# Patient Record
Sex: FEMALE | Race: WHITE | NOT HISPANIC OR LATINO | Employment: OTHER | RURAL
[De-identification: names, ages, dates, MRNs, and addresses within clinical notes are randomized per-mention and may not be internally consistent; named-entity substitution may affect disease eponyms.]

---

## 2017-09-16 ENCOUNTER — OFFICE VISIT (OUTPATIENT)
Dept: RETAIL CLINIC | Facility: CLINIC | Age: 67
End: 2017-09-16

## 2017-09-16 VITALS
WEIGHT: 144 LBS | OXYGEN SATURATION: 98 % | TEMPERATURE: 98 F | HEART RATE: 121 BPM | HEIGHT: 60 IN | RESPIRATION RATE: 17 BRPM | BODY MASS INDEX: 28.27 KG/M2

## 2017-09-16 DIAGNOSIS — J40 BRONCHITIS: Primary | ICD-10-CM

## 2017-09-16 DIAGNOSIS — J01.00 ACUTE MAXILLARY SINUSITIS, RECURRENCE NOT SPECIFIED: ICD-10-CM

## 2017-09-16 PROCEDURE — 99213 OFFICE O/P EST LOW 20 MIN: CPT | Performed by: NURSE PRACTITIONER

## 2017-09-16 RX ORDER — AZITHROMYCIN 250 MG/1
TABLET, FILM COATED ORAL
Qty: 6 TABLET | Refills: 0 | Status: SHIPPED | OUTPATIENT
Start: 2017-09-16 | End: 2022-11-04

## 2017-09-16 RX ORDER — PREDNISONE 20 MG/1
20 TABLET ORAL DAILY
Qty: 3 TABLET | Refills: 0 | Status: SHIPPED | OUTPATIENT
Start: 2017-09-16 | End: 2017-09-19

## 2017-09-16 NOTE — PATIENT INSTRUCTIONS
Sinusitis, Adult  Sinusitis is soreness and inflammation of your sinuses. Sinuses are hollow spaces in the bones around your face. Your sinuses are located:  · Around your eyes.  · In the middle of your forehead.  · Behind your nose.  · In your cheekbones.  Your sinuses and nasal passages are lined with a stringy fluid (mucus). Mucus normally drains out of your sinuses. When your nasal tissues become inflamed or swollen, the mucus can become trapped or blocked so air cannot flow through your sinuses. This allows bacteria, viruses, and funguses to grow, which leads to infection.  Sinusitis can develop quickly and last for 7-10 days (acute) or for more than 12 weeks (chronic). Sinusitis often develops after a cold.  CAUSES  This condition is caused by anything that creates swelling in the sinuses or stops mucus from draining, including:  · Allergies.  · Asthma.  · Bacterial or viral infection.  · Abnormally shaped bones between the nasal passages.  · Nasal growths that contain mucus (nasal polyps).  · Narrow sinus openings.  · Pollutants, such as chemicals or irritants in the air.  · A foreign object stuck in the nose.  · A fungal infection. This is rare.  RISK FACTORS  The following factors may make you more likely to develop this condition:  · Having allergies or asthma.  · Having had a recent cold or respiratory tract infection.  · Having structural deformities or blockages in your nose or sinuses.  · Having a weak immune system.  · Doing a lot of swimming or diving.  · Overusing nasal sprays.  · Smoking.  SYMPTOMS  The main symptoms of this condition are pain and a feeling of pressure around the affected sinuses. Other symptoms include:  · Upper toothache.  · Earache.  · Headache.  · Bad breath.  · Decreased sense of smell and taste.  · A cough that may get worse at night.  · Fatigue.  · Fever.  · Thick drainage from your nose. The drainage is often green and it may contain pus (purulent).  · Stuffy nose or  congestion.  · Postnasal drip. This is when extra mucus collects in the throat or back of the nose.  · Swelling and warmth over the affected sinuses.  · Sore throat.  · Sensitivity to light.  DIAGNOSIS  This condition is diagnosed based on symptoms, a medical history, and a physical exam. To find out if your condition is acute or chronic, your health care provider may:  · Look in your nose for signs of nasal polyps.  · Tap over the affected sinus to check for signs of infection.  · View the inside of your sinuses using an imaging device that has a light attached (endoscope).  If your health care provider suspects that you have chronic sinusitis, you may also:  · Be tested for allergies.  · Have a sample of mucus taken from your nose (nasal culture) and checked for bacteria.  · Have a mucus sample examined to see if your sinusitis is related to an allergy.  If your sinusitis does not respond to treatment and it lasts longer than 8 weeks, you may have an MRI or CT scan to check your sinuses. These scans also help to determine how severe your infection is.  In rare cases, a bone biopsy may be done to rule out more serious types of fungal sinus disease.  TREATMENT  Treatment for sinusitis depends on the cause and whether your condition is chronic or acute. If a virus is causing your sinusitis, your symptoms will go away on their own within 10 days. You may be given medicines to relieve your symptoms, including:  · Topical nasal decongestants. They shrink swollen nasal passages and let mucus drain from your sinuses.  · Antihistamines. These drugs block inflammation that is triggered by allergies. This can help to ease swelling in your nose and sinuses.  · Topical nasal corticosteroids. These are nasal sprays that ease inflammation and swelling in your nose and sinuses.  · Nasal saline washes. These rinses can help to get rid of thick mucus in your nose.  If your condition is caused by bacteria, you will be given an  antibiotic medicine. If your condition is caused by a fungus, you will be given an antifungal medicine.  Surgery may be needed to correct underlying conditions, such as narrow nasal passages. Surgery may also be needed to remove polyps.  HOME CARE INSTRUCTIONS  Medicines  · Take, use, or apply over-the-counter and prescription medicines only as told by your health care provider. These may include nasal sprays.  · If you were prescribed an antibiotic medicine, take it as told by your health care provider. Do not stop taking the antibiotic even if you start to feel better.  Hydrate and Humidify  · Drink enough water to keep your urine clear or pale yellow. Staying hydrated will help to thin your mucus.  · Use a cool mist humidifier to keep the humidity level in your home above 50%.  · Inhale steam for 10-15 minutes, 3-4 times a day or as told by your health care provider. You can do this in the bathroom while a hot shower is running.  · Limit your exposure to cool or dry air.  Rest  · Rest as much as possible.  · Sleep with your head raised (elevated).  · Make sure to get enough sleep each night.  General Instructions  · Apply a warm, moist washcloth to your face 3-4 times a day or as told by your health care provider. This will help with discomfort.  · Wash your hands often with soap and water to reduce your exposure to viruses and other germs. If soap and water are not available, use hand .  · Do not smoke. Avoid being around people who are smoking (secondhand smoke).  · Keep all follow-up visits as told by your health care provider. This is important.  SEEK MEDICAL CARE IF:  · You have a fever.  · Your symptoms get worse.  · Your symptoms do not improve within 10 days.  SEEK IMMEDIATE MEDICAL CARE IF:  · You have a severe headache.  · You have persistent vomiting.  · You have pain or swelling around your face or eyes.  · You have vision problems.  · You develop confusion.  · Your neck is stiff.  · You have  "trouble breathing.     This information is not intended to replace advice given to you by your health care provider. Make sure you discuss any questions you have with your health care provider.     Document Released: 12/18/2006 Document Revised: 04/10/2017 Document Reviewed: 10/12/2016  [employer] Patient Education ©2017 [employer]    Upper Respiratory Infection, Adult  Most upper respiratory infections (URIs) are a viral infection of the air passages leading to the lungs. A URI affects the nose, throat, and upper air passages. The most common type of URI is nasopharyngitis and is typically referred to as \"the common cold.\"  URIs run their course and usually go away on their own. Most of the time, a URI does not require medical attention, but sometimes a bacterial infection in the upper airways can follow a viral infection. This is called a secondary infection. Sinus and middle ear infections are common types of secondary upper respiratory infections.  Bacterial pneumonia can also complicate a URI. A URI can worsen asthma and chronic obstructive pulmonary disease (COPD). Sometimes, these complications can require emergency medical care and may be life threatening.   CAUSES  Almost all URIs are caused by viruses. A virus is a type of germ and can spread from one person to another.   RISKS FACTORS  You may be at risk for a URI if:   · You smoke.    · You have chronic heart or lung disease.  · You have a weakened defense (immune) system.    · You are very young or very old.    · You have nasal allergies or asthma.  · You work in crowded or poorly ventilated areas.  · You work in health care facilities or schools.  SIGNS AND SYMPTOMS   Symptoms typically develop 2-3 days after you come in contact with a cold virus. Most viral URIs last 7-10 days. However, viral URIs from the influenza virus (flu virus) can last 14-18 days and are typically more severe. Symptoms may include:   · Runny or stuffy (congested) " nose.    · Sneezing.    · Cough.    · Sore throat.    · Headache.    · Fatigue.    · Fever.    · Loss of appetite.    · Pain in your forehead, behind your eyes, and over your cheekbones (sinus pain).  · Muscle aches.    DIAGNOSIS   Your health care provider may diagnose a URI by:  · Physical exam.  · Tests to check that your symptoms are not due to another condition such as:  ¨ Strep throat.  ¨ Sinusitis.  ¨ Pneumonia.  ¨ Asthma.  TREATMENT   A URI goes away on its own with time. It cannot be cured with medicines, but medicines may be prescribed or recommended to relieve symptoms. Medicines may help:  · Reduce your fever.  · Reduce your cough.  · Relieve nasal congestion.  HOME CARE INSTRUCTIONS   · Take medicines only as directed by your health care provider.    · Gargle warm saltwater or take cough drops to comfort your throat as directed by your health care provider.  · Use a warm mist humidifier or inhale steam from a shower to increase air moisture. This may make it easier to breathe.  · Drink enough fluid to keep your urine clear or pale yellow.    · Eat soups and other clear broths and maintain good nutrition.    · Rest as needed.    · Return to work when your temperature has returned to normal or as your health care provider advises. You may need to stay home longer to avoid infecting others. You can also use a face mask and careful hand washing to prevent spread of the virus.  · Increase the usage of your inhaler if you have asthma.    · Do not use any tobacco products, including cigarettes, chewing tobacco, or electronic cigarettes. If you need help quitting, ask your health care provider.  PREVENTION   The best way to protect yourself from getting a cold is to practice good hygiene.   · Avoid oral or hand contact with people with cold symptoms.    · Wash your hands often if contact occurs.    There is no clear evidence that vitamin C, vitamin E, echinacea, or exercise reduces the chance of developing a  cold. However, it is always recommended to get plenty of rest, exercise, and practice good nutrition.   SEEK MEDICAL CARE IF:   · You are getting worse rather than better.    · Your symptoms are not controlled by medicine.    · You have chills.  · You have worsening shortness of breath.  · You have brown or red mucus.  · You have yellow or brown nasal discharge.  · You have pain in your face, especially when you bend forward.  · You have a fever.  · You have swollen neck glands.  · You have pain while swallowing.  · You have white areas in the back of your throat.  SEEK IMMEDIATE MEDICAL CARE IF:   · You have severe or persistent:    Headache.    Ear pain.    Sinus pain.    Chest pain.  · You have chronic lung disease and any of the following:    Wheezing.    Prolonged cough.    Coughing up blood.    A change in your usual mucus.  · You have a stiff neck.  · You have changes in your:    Vision.    Hearing.    Thinking.    Mood.  MAKE SURE YOU:   · Understand these instructions.  · Will watch your condition.  · Will get help right away if you are not doing well or get worse.     This information is not intended to replace advice given to you by your health care provider. Make sure you discuss any questions you have with your health care provider.     Document Released: 06/13/2002 Document Revised: 05/03/2016 Document Reviewed: 03/25/2015  [employer] Patient Education ©2017 [employer]

## 2017-09-16 NOTE — PROGRESS NOTES
"Yola Real is a 67 y.o. female.   Pulse (!) 121  Temp 98 °F (36.7 °C)  Resp 17  Ht 60\" (152.4 cm)  Wt 144 lb (65.3 kg)  SpO2 98%  BMI 28.12 kg/m2      Sinusitis   This is a new problem. The current episode started in the past 7 days. The problem has been gradually worsening since onset. The maximum temperature recorded prior to her arrival was 100.4 - 100.9 F. The pain is moderate. Associated symptoms include congestion, coughing (productive), diaphoresis, headaches, sinus pressure (worse on left side) and a sore throat. Pertinent negatives include no ear pain, hoarse voice, neck pain, shortness of breath, sneezing or swollen glands.        The following portions of the patient's history were reviewed and updated as appropriate: allergies, current medications, past family history, past medical history, past social history, past surgical history and problem list.    Review of Systems   Constitutional: Positive for diaphoresis.   HENT: Positive for congestion, sinus pressure (worse on left side) and sore throat. Negative for ear pain, hoarse voice and sneezing.    Respiratory: Positive for cough (productive). Negative for shortness of breath.    Musculoskeletal: Negative for neck pain.   Neurological: Positive for headaches.       Objective   Physical Exam   Constitutional: She appears well-developed and well-nourished.  Non-toxic appearance. She has a sickly appearance.   HENT:   Head: Normocephalic and atraumatic.   Right Ear: Tympanic membrane is bulging. Tympanic membrane is not erythematous.   Left Ear: Tympanic membrane is bulging. Tympanic membrane is not erythematous.   Nose: Mucosal edema and rhinorrhea present. Right sinus exhibits maxillary sinus tenderness. Left sinus exhibits maxillary sinus tenderness.   Mouth/Throat: Uvula is midline. Posterior oropharyngeal erythema present. No tonsillar exudate.   Cardiovascular: Regular rhythm and normal heart sounds.    Pulmonary/Chest: " Effort normal. She has no wheezes. She has no rhonchi. She has no rales.   Lymphadenopathy:     She has no cervical adenopathy.   Skin: Skin is warm and dry.       Assessment/Plan   Kymberly was seen today for sinusitis.    Diagnoses and all orders for this visit:    Bronchitis    Acute maxillary sinusitis, recurrence not specified    Other orders  -     azithromycin (ZITHROMAX Z-EVAN) 250 MG tablet; Take 2 tablets the first day, then 1 tablet daily for 4 days.  -     predniSONE (DELTASONE) 20 MG tablet; Take 1 tablet by mouth Daily for 3 days.

## 2022-08-19 DIAGNOSIS — G57.93 NEUROPATHY INVOLVING BOTH LOWER EXTREMITIES: Primary | ICD-10-CM

## 2022-08-19 RX ORDER — GABAPENTIN 300 MG/1
CAPSULE ORAL
Qty: 150 CAPSULE | Refills: 0 | Status: SHIPPED | OUTPATIENT
Start: 2022-08-19 | End: 2022-09-19

## 2022-09-18 DIAGNOSIS — G57.93 NEUROPATHY INVOLVING BOTH LOWER EXTREMITIES: ICD-10-CM

## 2022-09-19 RX ORDER — DOXEPIN HYDROCHLORIDE 50 MG/1
CAPSULE ORAL
Qty: 180 CAPSULE | Refills: 0 | Status: SHIPPED | OUTPATIENT
Start: 2022-09-19 | End: 2022-12-14

## 2022-09-19 RX ORDER — GABAPENTIN 300 MG/1
CAPSULE ORAL
Qty: 150 CAPSULE | Refills: 0 | Status: SHIPPED | OUTPATIENT
Start: 2022-09-19 | End: 2022-10-18

## 2022-10-18 DIAGNOSIS — G57.93 NEUROPATHY INVOLVING BOTH LOWER EXTREMITIES: ICD-10-CM

## 2022-10-18 RX ORDER — LISINOPRIL 10 MG/1
TABLET ORAL
Qty: 90 TABLET | Refills: 0 | Status: SHIPPED | OUTPATIENT
Start: 2022-10-18 | End: 2023-01-12

## 2022-10-18 RX ORDER — GABAPENTIN 300 MG/1
CAPSULE ORAL
Qty: 150 CAPSULE | Refills: 0 | Status: SHIPPED | OUTPATIENT
Start: 2022-10-18 | End: 2022-11-16

## 2022-11-03 RX ORDER — ROPINIROLE 1 MG/1
1 TABLET, FILM COATED ORAL
COMMUNITY
Start: 2022-09-26 | End: 2023-01-03

## 2022-11-03 RX ORDER — PRAVASTATIN SODIUM 40 MG
40 TABLET ORAL
COMMUNITY
Start: 2022-10-12 | End: 2023-01-12

## 2022-11-04 ENCOUNTER — OFFICE VISIT (OUTPATIENT)
Dept: FAMILY MEDICINE CLINIC | Facility: CLINIC | Age: 72
End: 2022-11-04

## 2022-11-04 VITALS
SYSTOLIC BLOOD PRESSURE: 118 MMHG | OXYGEN SATURATION: 99 % | TEMPERATURE: 97 F | WEIGHT: 139.5 LBS | HEART RATE: 82 BPM | HEIGHT: 60 IN | DIASTOLIC BLOOD PRESSURE: 77 MMHG | BODY MASS INDEX: 27.39 KG/M2

## 2022-11-04 DIAGNOSIS — M79.7 FIBROMYALGIA: ICD-10-CM

## 2022-11-04 DIAGNOSIS — I10 ESSENTIAL (PRIMARY) HYPERTENSION: ICD-10-CM

## 2022-11-04 DIAGNOSIS — M85.852 OSTEOPENIA OF BOTH HIPS: ICD-10-CM

## 2022-11-04 DIAGNOSIS — Z00.00 ROUTINE GENERAL MEDICAL EXAMINATION AT A HEALTH CARE FACILITY: Primary | ICD-10-CM

## 2022-11-04 DIAGNOSIS — M85.851 OSTEOPENIA OF BOTH HIPS: ICD-10-CM

## 2022-11-04 DIAGNOSIS — G89.29 CHRONIC BILATERAL LOW BACK PAIN WITH LEFT-SIDED SCIATICA: ICD-10-CM

## 2022-11-04 DIAGNOSIS — G89.29 CHRONIC BILATERAL LOW BACK PAIN WITH RIGHT-SIDED SCIATICA: ICD-10-CM

## 2022-11-04 DIAGNOSIS — E66.3 OVERWEIGHT: ICD-10-CM

## 2022-11-04 DIAGNOSIS — J43.9 PULMONARY EMPHYSEMA, UNSPECIFIED EMPHYSEMA TYPE: ICD-10-CM

## 2022-11-04 DIAGNOSIS — R53.83 OTHER FATIGUE: ICD-10-CM

## 2022-11-04 DIAGNOSIS — F17.210 LIGHT CIGARETTE SMOKER (1-9 CIGS/DAY): ICD-10-CM

## 2022-11-04 DIAGNOSIS — M54.41 CHRONIC BILATERAL LOW BACK PAIN WITH RIGHT-SIDED SCIATICA: ICD-10-CM

## 2022-11-04 DIAGNOSIS — E78.5 DYSLIPIDEMIA: ICD-10-CM

## 2022-11-04 DIAGNOSIS — E55.9 VITAMIN D INSUFFICIENCY: ICD-10-CM

## 2022-11-04 DIAGNOSIS — R73.9 HYPERGLYCEMIA: ICD-10-CM

## 2022-11-04 DIAGNOSIS — G25.81 RESTLESS LEG SYNDROME: ICD-10-CM

## 2022-11-04 DIAGNOSIS — Z11.59 NEED FOR HEPATITIS C SCREENING TEST: ICD-10-CM

## 2022-11-04 DIAGNOSIS — M54.42 CHRONIC BILATERAL LOW BACK PAIN WITH LEFT-SIDED SCIATICA: ICD-10-CM

## 2022-11-04 DIAGNOSIS — N18.31 CHRONIC KIDNEY DISEASE, STAGE 3A: ICD-10-CM

## 2022-11-04 PROBLEM — Z86.69 HISTORY OF MIGRAINE HEADACHES: Status: ACTIVE | Noted: 2022-11-04

## 2022-11-04 PROBLEM — R05.3 CHRONIC COUGH: Status: ACTIVE | Noted: 2022-11-04

## 2022-11-04 PROBLEM — J44.9 COPD (CHRONIC OBSTRUCTIVE PULMONARY DISEASE) (HCC): Status: ACTIVE | Noted: 2022-11-04

## 2022-11-04 PROBLEM — M85.9 DISORDER OF BONE DENSITY AND STRUCTURE, UNSPECIFIED: Status: ACTIVE | Noted: 2022-11-04

## 2022-11-04 PROBLEM — J44.9 CHRONIC OBSTRUCTIVE PULMONARY DISEASE, UNSPECIFIED: Status: ACTIVE | Noted: 2022-11-04

## 2022-11-04 PROBLEM — M85.80 OSTEOPENIA: Status: ACTIVE | Noted: 2022-11-04

## 2022-11-04 PROCEDURE — G0439 PPPS, SUBSEQ VISIT: HCPCS | Performed by: INTERNAL MEDICINE

## 2022-11-04 PROCEDURE — 1160F RVW MEDS BY RX/DR IN RCRD: CPT | Performed by: INTERNAL MEDICINE

## 2022-11-04 PROCEDURE — 36415 COLL VENOUS BLD VENIPUNCTURE: CPT | Performed by: INTERNAL MEDICINE

## 2022-11-04 PROCEDURE — 1170F FXNL STATUS ASSESSED: CPT | Performed by: INTERNAL MEDICINE

## 2022-11-04 PROCEDURE — 93000 ELECTROCARDIOGRAM COMPLETE: CPT | Performed by: INTERNAL MEDICINE

## 2022-11-04 RX ORDER — CHLORAL HYDRATE 500 MG
CAPSULE ORAL
COMMUNITY

## 2022-11-04 RX ORDER — ASCORBIC ACID 100 MG
TABLET,CHEWABLE ORAL
COMMUNITY

## 2022-11-04 RX ORDER — ALBUTEROL SULFATE 90 UG/1
2 AEROSOL, METERED RESPIRATORY (INHALATION) EVERY 4 HOURS PRN
Qty: 18 G | Refills: 3 | Status: SHIPPED | OUTPATIENT
Start: 2022-11-04

## 2022-11-04 RX ORDER — CYCLOBENZAPRINE HCL 10 MG
TABLET ORAL
COMMUNITY
End: 2022-11-04

## 2022-11-04 NOTE — PROGRESS NOTES
The ABCs of the Annual Wellness Visit  Subsequent Medicare Wellness Visit    Chief Complaint   Patient presents with   • Annual Exam      Subjective    History of Present Illness:  Kymberly Real is a 72 y.o. female who presents for a Subsequent Medicare Wellness Visit.    Patient presents for her yearly adult wellness visit.  Her primary complaint relates to chronic low back pain with bilateral sciatica, right greater than left, rating her pain typically anywhere from a 5-10 on a scale of 10.  She has seen Dr. Surya Preciado orthopedic surgeon relating several months ago having had another MRI of her lower back, felt nonsurgical, having failed epidural injections, and recently having been referred to pain management.  She does take gabapentin 300 mg, 5 capsules daily in divided doses for this problem, not getting much relief, not a candidate for NSAIDs given her chronic kidney disease.  She also has history of COPD manifested by cough but really no dyspnea.  Cannot afford any of the controller medications and uses albuterol only sporadically.  She continues to smoke about 5 cigarettes daily for the last 20 years previous to that 1 pack/day x 30 years.  Not yet ready to stop smoking.  History of hypertension and averaging 110s over 70s checked twice monthly.  Restless legs doing well on ropinirole.  No recent migraine headaches with history of same.  Current with colonoscopy, recommended for 3-year follow-up study in 5/2025.  2  The following portions of the patient's history were reviewed and   updated as appropriate: allergies, current medications, past family history, past medical history, past social history, past surgical history and problem list.    Compared to one year ago, the patient feels her physical   health is the same.    Compared to one year ago, the patient feels her mental   health is the same.    Recent Hospitalizations:  She was not admitted to the hospital during the last year.       Current  Medical Providers:  Patient Care Team:  Kai Coats MD as PCP - General (Internal Medicine)  Kai Coats MD as Referring Physician (Internal Medicine)    Outpatient Medications Prior to Visit   Medication Sig Dispense Refill   • ALENDRONATE SODIUM PO Take  by mouth.     • Ascorbic Acid (Vitamin C) 100 MG chewable tablet Vitamin C     • Calcium Carbonate-Vitamin D 600-10 MG-MCG per tablet Take 1 tablet by mouth 2 (Two) Times a Day.     • doxepin (SINEquan) 50 MG capsule TAKE 2 CAPSULES BY MOUTH AT BEDTIME 180 capsule 0   • gabapentin (NEURONTIN) 300 MG capsule TAKE 1 CAPSULE BY MOUTH IN THE MORNING, 1 IN THE AFTERNOON, AND 3 AT BEDTIME 150 capsule 0   • lisinopril (PRINIVIL,ZESTRIL) 10 MG tablet Take 1 tablet by mouth once daily 90 tablet 0   • Omega-3 Fatty Acids (fish oil) 1000 MG capsule capsule Fish Oil     • pravastatin (PRAVACHOL) 40 MG tablet Take 1 tablet by mouth every night at bedtime.     • rOPINIRole (REQUIP) 1 MG tablet Take 1 tablet by mouth every night at bedtime.     • Calcium Carbonate-Vitamin D (CALTRATE 600+D PO) Caltrate     • Acetaminophen (Tylenol) 325 MG capsule Tylenol     • azithromycin (ZITHROMAX Z-EVAN) 250 MG tablet Take 2 tablets the first day, then 1 tablet daily for 4 days. 6 tablet 0   • cyclobenzaprine (FLEXERIL) 10 MG tablet cyclobenzaprine 10 mg tablet   TAKE 1 TABLET BY MOUTH AT BEDTIME     • Doxepin HCl, Antipruritic, (DOXEPIN HCL EX) Apply  topically.     • FENOFIBRATE PO Take  by mouth.     • GABAPENTIN PO Take  by mouth.     • PRAVASTATIN SODIUM PO Take  by mouth.       No facility-administered medications prior to visit.       No opioid medication identified on active medication list. I have reviewed chart for other potential  high risk medication/s and harmful drug interactions in the elderly.          Aspirin is not on active medication list.  Aspirin use is not indicated based on review of current medical condition/s. Risk of harm outweighs potential benefits.   ".    Patient Active Problem List   Diagnosis   • Chronic cough   • Chronic obstructive pulmonary disease, unspecified (HCC)   • COPD (chronic obstructive pulmonary disease) (HCC)   • Disorder of bone density and structure, unspecified   • Dyslipidemia   • Essential (primary) hypertension   • Fibromyalgia   • History of migraine headaches   • Lumbago with sciatica, left side   • Lumbago with sciatica, right side   • Osteopenia   • Overweight   • Restless leg syndrome     Advance Care Planning  Advance Directive is not on file.  ACP discussion was held with the patient during this visit. Patient does not have an advance directive, information provided.          Objective    Vitals:    11/04/22 0911   BP: 118/77   BP Location: Left arm   Patient Position: Sitting   Cuff Size: Adult   Pulse: 82   Temp: 97 °F (36.1 °C)   TempSrc: Temporal   SpO2: 99%   Weight: 63.3 kg (139 lb 8 oz)   Height: 152.4 cm (60\")     Estimated body mass index is 27.24 kg/m² as calculated from the following:    Height as of this encounter: 152.4 cm (60\").    Weight as of this encounter: 63.3 kg (139 lb 8 oz).    BMI is >= 25 and <30. (Overweight) The following options were offered after discussion;: weight loss educational material (shared in after visit summary), exercise counseling/recommendations and nutrition counseling/recommendations      Does the patient have evidence of cognitive impairment? Yes    Physical Exam  Vitals and nursing note reviewed. Exam conducted with a chaperone present.   Constitutional:       Appearance: Normal appearance.      Comments: Smaller statured, minimally overweight, healthy, complaining of low back pain   HENT:      Head: Normocephalic and atraumatic.      Right Ear: Tympanic membrane, ear canal and external ear normal.      Left Ear: Tympanic membrane, ear canal and external ear normal.      Nose: Nose normal.      Mouth/Throat:      Mouth: Mucous membranes are moist.      Pharynx: Oropharynx is clear.      " Comments: Native dentition with mild decay noted otherwise moist membranes that are clear  Eyes:      Extraocular Movements: Extraocular movements intact.      Conjunctiva/sclera: Conjunctivae normal.      Pupils: Pupils are equal, round, and reactive to light.   Neck:      Vascular: No carotid bruit.      Comments: No masses or adenopathy  Cardiovascular:      Rate and Rhythm: Normal rate and regular rhythm.      Pulses: Normal pulses.      Heart sounds: Normal heart sounds. No murmur heard.    No friction rub. No gallop.   Pulmonary:      Effort: Pulmonary effort is normal.      Breath sounds: Normal breath sounds.      Comments: No cough wheeze or dyspnea, benign exam  Chest:   Breasts:     Kaushal Score is 5.      Breasts are symmetrical.      Right: Normal. No swelling, mass, nipple discharge, skin change or tenderness.      Left: Normal. No swelling, mass, nipple discharge, skin change or tenderness.   Abdominal:      General: Abdomen is flat. Bowel sounds are normal.      Palpations: Abdomen is soft.      Comments: Nontender nondistended with no organomegaly or masses   Genitourinary:     Comments: Pelvic and rectal exams deferred given hysterectomy for benign disease and lack of any anal or rectal problems  Musculoskeletal:         General: No swelling, tenderness or deformity. Normal range of motion.      Cervical back: Normal range of motion and neck supple. No rigidity or tenderness.      Right lower leg: No edema.      Left lower leg: No edema.      Comments: Mild chronic tenderness palpation of the lower back with more intrinsic low back pain with positional changes, positive straight leg raise bilaterally,  mild degenerative changes in her hands no significant tenderness palpation of her musculature   Lymphadenopathy:      Cervical: No cervical adenopathy.      Upper Body:      Right upper body: No supraclavicular or axillary adenopathy.      Left upper body: No supraclavicular or axillary adenopathy.    Skin:     General: Skin is warm and dry.      Capillary Refill: Capillary refill takes less than 2 seconds.      Findings: No lesion or rash.   Neurological:      General: No focal deficit present.      Mental Status: She is alert and oriented to person, place, and time. Mental status is at baseline.      Cranial Nerves: No cranial nerve deficit.      Sensory: No sensory deficit.      Motor: No weakness.      Coordination: Coordination normal.      Comments: Positive straight leg raise bilaterally, right greater than left causing low back pain and some proximal posterior leg pain, difficult to elicit knee jerks and ankle jerks bilaterally and symmetrically, normal sensation in both feet to fine touch vibration and pinprick with motor exam intact   Psychiatric:         Mood and Affect: Mood normal.         Behavior: Behavior normal.         Thought Content: Thought content normal.         Judgment: Judgment normal.               ECG 12 Lead    Date/Time: 11/4/2022 10:09 AM  Performed by: Kai Coats MD  Authorized by: Kai Coats MD   Comparison: compared with previous ECG from 10/29/2021  Similar to previous ECG  Comparison to previous ECG: Normal sinus rhythm rate 84 with a single PVC, no ischemic changes, no other abnormalities noted, no change versus prior tracing              HEALTH RISK ASSESSMENT    Smoking Status:  Social History     Tobacco Use   Smoking Status Every Day   • Packs/day: 0.25   • Years: 50.00   • Pack years: 12.50   • Types: Cigarettes   Smokeless Tobacco Never   Tobacco Comments    IN ADVANCED MD SAYS NON SMOKER     Alcohol Consumption:  Social History     Substance and Sexual Activity   Alcohol Use Not Currently    Comment: [facility]     Fall Risk Screen:    STEADI Fall Risk Assessment was completed, and patient is at LOW risk for falls.Assessment completed on:11/4/2022    Depression Screening:  PHQ-2/PHQ-9 Depression Screening 11/4/2022   Little Interest or Pleasure in Doing  Things 0-->not at all   Feeling Down, Depressed or Hopeless 0-->not at all   PHQ-9: Brief Depression Severity Measure Score 0       Health Habits and Functional and Cognitive Screening:  No flowsheet data found.    Age-appropriate Screening Schedule:  Refer to the list below for future screening recommendations based on patient's age, sex and/or medical conditions. Orders for these recommended tests are listed in the plan section. The patient has been provided with a written plan.    Health Maintenance   Topic Date Due   • LIPID PANEL  Never done   • ZOSTER VACCINE (2 of 3) 12/08/2017   • DXA SCAN  02/11/2022   • MAMMOGRAM  10/13/2023   • TDAP/TD VACCINES (2 - Td or Tdap) 07/13/2025   • INFLUENZA VACCINE  Completed              Assessment & Plan   CMS Preventative Services Quick Reference  Risk Factors Identified During Encounter  Chronic Pain   Inactivity/Sedentary  Obesity/Overweight   Tobacco Use/Dependance (use dotphrase .tobaccocessation for documentation)  The above risks/problems have been discussed with the patient.  Follow up actions/plans if indicated are seen below in the Assessment/Plan Section.  Pertinent information has been shared with the patient in the After Visit Summary.    Diagnoses and all orders for this visit:    1. Routine general medical examination at a health care facility (Primary)    2. Chronic bilateral low back pain with left-sided sciatica  -     Urine Drug Screen - Urine, Clean Catch; Future  -     Gabapentin, Urine -; Future  -     Gabapentin, Urine - Urine, Clean Catch  -     Urine Drug Screen - Urine, Clean Catch  Lifestyle limiting back pain with bilateral sciatica, right greater than left.  Has seen Dr. Surya Preciado, having failed epidural injections, apparently having had a repeat MRI of her lumbar spine several months ago, results pending to me, but deemed nonsurgical.  Given her intractable pain despite use of gabapentin 300 mg taking 5 daily in divided doses, noncandidate  for NSAIDs given her chronic kidney disease, she has been referred by Dr Preciado to pain management.  3. Chronic bilateral low back pain with right-sided sciatica  See above.  4. Fibromyalgia  -     Urine Drug Screen - Urine, Clean Catch; Future  -     Gabapentin, Urine -; Future  -     Gabapentin, Urine - Urine, Clean Catch  -     Urine Drug Screen - Urine, Clean Catch  Fibromyalgia generally stable on gabapentin.  5. Essential (primary) hypertension  -     Comprehensive Metabolic Panel; Future  -     Lipid Panel; Future  -     Hemoglobin A1c; Future  -     Urinalysis With Culture If Indicated -; Future  -     ECG 12 Lead  -     Urinalysis With Culture If Indicated - Urine, Clean Catch  -     Hemoglobin A1c  -     Lipid Panel  -     Comprehensive Metabolic Panel  Controlled acutely by history chronically.  Continue her lisinopril 10 mg daily with monitoring, ideal parameters discussed.  Update appropriate labs.  EKG today revealing a single PVC previously noted, otherwise unremarkable.  6. Chronic kidney disease, stage 3a (HCC)  On ACE Sydnie, most recent creatinine 1.2 with GFR 47 in 11/2021.  Update testing  7. Dyslipidemia  -     Lipid Panel; Future  -     Hemoglobin A1c; Future  -     Hemoglobin A1c  -     Lipid Panel  Taking pravastatin 40 mg daily.  Update lipid profile  8. Overweight  -     TSH Rfx On Abnormal To Free T4; Future  -     TSH Rfx On Abnormal To Free T4  Discussed need for healthy diet as well as exercise as able, recognizing her chronic pain syndrome.  9. Osteopenia of both hips  -     DEXA Bone Density Axial; Future  On Fosamax 70 mg weekly with calcium and vitamin D twice daily.  Update DEXA scan  10. Restless leg syndrome  Clinically doing well on ropinirole 1 mg nightly  11. Pulmonary emphysema, unspecified emphysema type (HCC)  Really only symptoms are hacking intermittent cough, no wheeze or dyspnea.  Previously unable to afford controller medication.  Using albuterol as needed.  12.  Vitamin D insufficiency  -     Vitamin D,25-Hydroxy; Future  -     Vitamin D,25-Hydroxy  Check level.  On supplement and her calcium.  13. Hyperglycemia  -     Hemoglobin A1c; Future  -     Hemoglobin A1c    14. Other fatigue  -     TSH Rfx On Abnormal To Free T4; Future  -     TSH Rfx On Abnormal To Free T4    15. Light cigarette smoker (1-9 cigs/day)  -     CT Chest Low Dose Wo; Future  Approximate 40-pack-year history of smoking, currently smoking 5 cigarettes daily.  Advised strongly need to stop smoking for health risk reduction.  Update low-dose screening chest CT  16. Need for hepatitis C screening test  -     Hepatitis C Antibody; Future  -     Hepatitis C Antibody    Other orders  -     albuterol sulfate  (90 Base) MCG/ACT inhaler; Inhale 2 puffs Every 4 (Four) Hours As Needed for Wheezing.  Dispense: 18 g; Refill: 3        Follow Up:   Return in about 6 months (around 5/4/2023) for Recheck, Labs today.     An After Visit Summary and PPPS were made available to the patient.

## 2022-11-04 NOTE — PROGRESS NOTES
Venipuncture Blood Specimen Collection  Venipuncture performed in right arm by Juana Carmona MA with good hemostasis. Patient tolerated the procedure well without complications.   11/04/22   Juana Carmona MA

## 2022-11-05 LAB
25(OH)D3+25(OH)D2 SERPL-MCNC: 56.2 NG/ML (ref 30–100)
ALBUMIN SERPL-MCNC: 4.8 G/DL (ref 3.7–4.7)
ALBUMIN/GLOB SERPL: 2.1 {RATIO} (ref 1.2–2.2)
ALP SERPL-CCNC: 71 IU/L (ref 44–121)
ALT SERPL-CCNC: 21 IU/L (ref 0–32)
AST SERPL-CCNC: 18 IU/L (ref 0–40)
BILIRUB SERPL-MCNC: 0.2 MG/DL (ref 0–1.2)
BUN SERPL-MCNC: 35 MG/DL (ref 8–27)
BUN/CREAT SERPL: 29 (ref 12–28)
CALCIUM SERPL-MCNC: 9.7 MG/DL (ref 8.7–10.3)
CHLORIDE SERPL-SCNC: 106 MMOL/L (ref 96–106)
CHOLEST SERPL-MCNC: 161 MG/DL (ref 100–199)
CO2 SERPL-SCNC: 15 MMOL/L (ref 20–29)
CREAT SERPL-MCNC: 1.19 MG/DL (ref 0.57–1)
EGFRCR SERPLBLD CKD-EPI 2021: 49 ML/MIN/1.73
GLOBULIN SER CALC-MCNC: 2.3 G/DL (ref 1.5–4.5)
GLUCOSE SERPL-MCNC: ABNORMAL MG/DL
HBA1C MFR BLD: 5.5 % (ref 4.8–5.6)
HCV AB S/CO SERPL IA: 0.1 S/CO RATIO (ref 0–0.9)
HDLC SERPL-MCNC: 47 MG/DL
LDLC SERPL CALC-MCNC: 83 MG/DL (ref 0–99)
POTASSIUM SERPL-SCNC: ABNORMAL MMOL/L
PROT SERPL-MCNC: 7.1 G/DL (ref 6–8.5)
SODIUM SERPL-SCNC: 140 MMOL/L (ref 134–144)
TRIGL SERPL-MCNC: 183 MG/DL (ref 0–149)
TSH SERPL DL<=0.005 MIU/L-ACNC: 1.79 UIU/ML (ref 0.45–4.5)
VLDLC SERPL CALC-MCNC: 31 MG/DL (ref 5–40)

## 2022-11-10 ENCOUNTER — TELEPHONE (OUTPATIENT)
Dept: FAMILY MEDICINE CLINIC | Facility: CLINIC | Age: 72
End: 2022-11-10

## 2022-11-10 DIAGNOSIS — N30.00 ACUTE CYSTITIS WITHOUT HEMATURIA: Primary | ICD-10-CM

## 2022-11-10 LAB
AMPHETAMINES UR QL SCN: NORMAL NG/ML
APPEARANCE UR: ABNORMAL
BACTERIA #/AREA URNS HPF: ABNORMAL /[HPF]
BACTERIA UR CULT: ABNORMAL
BARBITURATES UR QL SCN: NORMAL
BENZODIAZ UR QL SCN: NORMAL
BILIRUB UR QL STRIP: NEGATIVE
BZE UR QL SCN: NORMAL
CANNABINOIDS UR QL SCN: NORMAL
CASTS URNS QL MICRO: ABNORMAL /LPF
COLOR UR: YELLOW
EPI CELLS #/AREA URNS HPF: >10 /HPF (ref 0–10)
GABAPENTIN UR-MCNC: 425.2 UG/ML
GLUCOSE UR QL STRIP: NEGATIVE
HGB UR QL STRIP: NEGATIVE
KETONES UR QL STRIP: NEGATIVE
LEUKOCYTE ESTERASE UR QL STRIP: NEGATIVE
METHADONE UR QL SCN: NORMAL
MICRO URNS: ABNORMAL
MICRO URNS: ABNORMAL
NITRITE UR QL STRIP: NEGATIVE
OPIATES UR QL SCN: NORMAL
OTHER ANTIBIOTIC SUSC ISLT: ABNORMAL
OXYCODONE+OXYMORPHONE UR QL SCN: NORMAL
PCP UR QL: NORMAL
PH UR STRIP: 5.5 [PH] (ref 5–7.5)
PROPOXYPH UR QL SCN: NORMAL
PROT UR QL STRIP: NEGATIVE
RBC #/AREA URNS HPF: ABNORMAL /HPF (ref 0–2)
REQUEST PROBLEM: NORMAL
SP GR UR STRIP: 1.01 (ref 1–1.03)
URINALYSIS REFLEX: ABNORMAL
UROBILINOGEN UR STRIP-MCNC: 0.2 MG/DL (ref 0.2–1)
WBC #/AREA URNS HPF: ABNORMAL /HPF (ref 0–5)

## 2022-11-10 RX ORDER — CIPROFLOXACIN 250 MG/1
250 TABLET, FILM COATED ORAL 2 TIMES DAILY
Qty: 6 TABLET | Refills: 0 | Status: SHIPPED | OUTPATIENT
Start: 2022-11-10 | End: 2022-11-13

## 2022-11-10 NOTE — TELEPHONE ENCOUNTER
Phone conversation patient regarding results of testing from 11/4/2022, belatedly finalized by lab, as follows:    Urinalysis cloudy, with 0-5 WBC, 0 RBC, moderate bacteria, culture growing 2 organisms, including 25-50,000 colonies of Enterococcus faecalis, and 1000 colonies of E. coli,  UDS canceled for undisclosed reason other than urine gabapentin properly positive  Vitamin D 56, normal  Hemoglobin A1c 5.5%, normal  Hepatitis C virus antibody negative  Lipid profile satisfactory  CMP satisfactory with creatinine 1.19, GFR 49, essentially unchanged, otherwise normal other than potassium not performed for technical reasons  TSH normal    Assessment/plan:  1.  Chronic kidney disease, stage IIIa, clinically stable.  2.  Acute cystitis.  Given allergy to penicillin and cephalosporins and based upon antibiotic symptoms, treat with low-dose Cipro to 50 mg twice daily x3 days.  3.  Remainder of laboratory testing satisfactory.  4.  Follow-up in 6 months as previously scheduled.

## 2022-11-16 DIAGNOSIS — G57.93 NEUROPATHY INVOLVING BOTH LOWER EXTREMITIES: ICD-10-CM

## 2022-11-16 RX ORDER — GABAPENTIN 300 MG/1
CAPSULE ORAL
Qty: 150 CAPSULE | Refills: 0 | Status: SHIPPED | OUTPATIENT
Start: 2022-11-16 | End: 2022-12-14

## 2022-12-09 DIAGNOSIS — F17.210 LIGHT CIGARETTE SMOKER (1-9 CIGS/DAY): ICD-10-CM

## 2022-12-09 DIAGNOSIS — M85.851 OSTEOPENIA OF BOTH HIPS: ICD-10-CM

## 2022-12-09 DIAGNOSIS — M85.852 OSTEOPENIA OF BOTH HIPS: ICD-10-CM

## 2022-12-12 ENCOUNTER — TELEPHONE (OUTPATIENT)
Dept: FAMILY MEDICINE CLINIC | Facility: CLINIC | Age: 72
End: 2022-12-12

## 2022-12-12 NOTE — TELEPHONE ENCOUNTER
----- Message from Kai Coats MD sent at 12/9/2022  5:20 PM EST -----  Please advise patient that her recent bone density scan was normal with recommendation to repeat another bone density study in 2 years.  Her recent low-dose screening chest CT similarly was normal with recommendation to repeat this study in 1 year

## 2022-12-14 DIAGNOSIS — G57.93 NEUROPATHY INVOLVING BOTH LOWER EXTREMITIES: ICD-10-CM

## 2022-12-14 RX ORDER — DOXEPIN HYDROCHLORIDE 50 MG/1
CAPSULE ORAL
Qty: 180 CAPSULE | Refills: 0 | Status: SHIPPED | OUTPATIENT
Start: 2022-12-14 | End: 2023-03-13

## 2022-12-14 RX ORDER — GABAPENTIN 300 MG/1
CAPSULE ORAL
Qty: 150 CAPSULE | Refills: 0 | Status: SHIPPED | OUTPATIENT
Start: 2022-12-14 | End: 2023-01-12

## 2023-01-03 RX ORDER — ROPINIROLE 1 MG/1
TABLET, FILM COATED ORAL
Qty: 90 TABLET | Refills: 0 | Status: SHIPPED | OUTPATIENT
Start: 2023-01-03

## 2023-01-12 DIAGNOSIS — G57.93 NEUROPATHY INVOLVING BOTH LOWER EXTREMITIES: ICD-10-CM

## 2023-01-12 RX ORDER — GABAPENTIN 300 MG/1
CAPSULE ORAL
Qty: 150 CAPSULE | Refills: 0 | Status: SHIPPED | OUTPATIENT
Start: 2023-01-12 | End: 2023-02-13

## 2023-01-12 RX ORDER — PRAVASTATIN SODIUM 40 MG
TABLET ORAL
Qty: 90 TABLET | Refills: 0 | Status: SHIPPED | OUTPATIENT
Start: 2023-01-12

## 2023-01-12 RX ORDER — LISINOPRIL 10 MG/1
TABLET ORAL
Qty: 90 TABLET | Refills: 0 | Status: SHIPPED | OUTPATIENT
Start: 2023-01-12

## 2023-02-12 DIAGNOSIS — G57.93 NEUROPATHY INVOLVING BOTH LOWER EXTREMITIES: ICD-10-CM

## 2023-02-13 RX ORDER — GABAPENTIN 300 MG/1
CAPSULE ORAL
Qty: 150 CAPSULE | Refills: 0 | Status: SHIPPED | OUTPATIENT
Start: 2023-02-13 | End: 2023-03-13

## 2023-03-12 DIAGNOSIS — G57.93 NEUROPATHY INVOLVING BOTH LOWER EXTREMITIES: ICD-10-CM

## 2023-03-13 RX ORDER — DOXEPIN HYDROCHLORIDE 50 MG/1
CAPSULE ORAL
Qty: 180 CAPSULE | Refills: 0 | Status: SHIPPED | OUTPATIENT
Start: 2023-03-13

## 2023-03-13 RX ORDER — GABAPENTIN 300 MG/1
CAPSULE ORAL
Qty: 150 CAPSULE | Refills: 0 | Status: SHIPPED | OUTPATIENT
Start: 2023-03-13

## 2023-04-12 DIAGNOSIS — G57.93 NEUROPATHY INVOLVING BOTH LOWER EXTREMITIES: ICD-10-CM

## 2023-04-12 RX ORDER — ROPINIROLE 1 MG/1
TABLET, FILM COATED ORAL
Qty: 90 TABLET | Refills: 0 | Status: SHIPPED | OUTPATIENT
Start: 2023-04-12

## 2023-04-12 RX ORDER — LISINOPRIL 10 MG/1
TABLET ORAL
Qty: 90 TABLET | Refills: 0 | Status: SHIPPED | OUTPATIENT
Start: 2023-04-12

## 2023-04-12 RX ORDER — PRAVASTATIN SODIUM 40 MG
TABLET ORAL
Qty: 90 TABLET | Refills: 0 | Status: SHIPPED | OUTPATIENT
Start: 2023-04-12

## 2023-04-12 NOTE — TELEPHONE ENCOUNTER
She is due for an appointment in May. I cannot approve the Gabapentin. Last RX was 3/13/23 for #[id number]

## 2023-04-13 RX ORDER — GABAPENTIN 300 MG/1
CAPSULE ORAL
Qty: 150 CAPSULE | Refills: 0 | Status: SHIPPED | OUTPATIENT
Start: 2023-04-13

## 2023-05-20 DIAGNOSIS — G57.93 NEUROPATHY INVOLVING BOTH LOWER EXTREMITIES: ICD-10-CM

## 2023-05-22 RX ORDER — GABAPENTIN 300 MG/1
CAPSULE ORAL
Qty: 150 CAPSULE | Refills: 0 | Status: SHIPPED | OUTPATIENT
Start: 2023-05-22

## 2023-06-12 ENCOUNTER — OFFICE VISIT (OUTPATIENT)
Dept: FAMILY MEDICINE CLINIC | Facility: CLINIC | Age: 73
End: 2023-06-12
Payer: MEDICARE

## 2023-06-12 VITALS
HEIGHT: 60 IN | DIASTOLIC BLOOD PRESSURE: 74 MMHG | TEMPERATURE: 97.6 F | OXYGEN SATURATION: 99 % | HEART RATE: 81 BPM | SYSTOLIC BLOOD PRESSURE: 119 MMHG | WEIGHT: 140.4 LBS | BODY MASS INDEX: 27.56 KG/M2

## 2023-06-12 DIAGNOSIS — G57.93 NEUROPATHY INVOLVING BOTH LOWER EXTREMITIES: ICD-10-CM

## 2023-06-12 DIAGNOSIS — M54.41 CHRONIC BILATERAL LOW BACK PAIN WITH RIGHT-SIDED SCIATICA: ICD-10-CM

## 2023-06-12 DIAGNOSIS — N18.31 STAGE 3A CHRONIC KIDNEY DISEASE: ICD-10-CM

## 2023-06-12 DIAGNOSIS — E66.3 OVERWEIGHT (BMI 25.0-29.9): ICD-10-CM

## 2023-06-12 DIAGNOSIS — M54.42 CHRONIC BILATERAL LOW BACK PAIN WITH LEFT-SIDED SCIATICA: ICD-10-CM

## 2023-06-12 DIAGNOSIS — R10.2 SUPRAPUBIC PRESSURE: ICD-10-CM

## 2023-06-12 DIAGNOSIS — E78.5 DYSLIPIDEMIA: ICD-10-CM

## 2023-06-12 DIAGNOSIS — I10 ESSENTIAL (PRIMARY) HYPERTENSION: Primary | ICD-10-CM

## 2023-06-12 DIAGNOSIS — G89.29 CHRONIC BILATERAL LOW BACK PAIN WITH RIGHT-SIDED SCIATICA: ICD-10-CM

## 2023-06-12 DIAGNOSIS — M79.7 FIBROMYALGIA: ICD-10-CM

## 2023-06-12 DIAGNOSIS — J43.9 PULMONARY EMPHYSEMA, UNSPECIFIED EMPHYSEMA TYPE: ICD-10-CM

## 2023-06-12 DIAGNOSIS — G89.29 CHRONIC BILATERAL LOW BACK PAIN WITH LEFT-SIDED SCIATICA: ICD-10-CM

## 2023-06-12 DIAGNOSIS — F17.211 CIGARETTE NICOTINE DEPENDENCE IN REMISSION: ICD-10-CM

## 2023-06-12 PROBLEM — F17.210 CIGARETTE SMOKER: Status: ACTIVE | Noted: 2023-06-12

## 2023-06-12 PROBLEM — J44.9 CHRONIC OBSTRUCTIVE PULMONARY DISEASE, UNSPECIFIED: Status: RESOLVED | Noted: 2022-11-04 | Resolved: 2023-06-12

## 2023-06-12 LAB
BILIRUB BLD-MCNC: NEGATIVE MG/DL
CLARITY, POC: CLEAR
COLOR UR: YELLOW
GLUCOSE UR STRIP-MCNC: NEGATIVE MG/DL
KETONES UR QL: NEGATIVE
LEUKOCYTE EST, POC: ABNORMAL
NITRITE UR-MCNC: NEGATIVE MG/ML
PH UR: 6 [PH] (ref 5–8)
PROT UR STRIP-MCNC: NEGATIVE MG/DL
RBC # UR STRIP: NEGATIVE /UL
SP GR UR: 1.01 (ref 1–1.03)
UROBILINOGEN UR QL: ABNORMAL

## 2023-06-12 PROCEDURE — 1159F MED LIST DOCD IN RCRD: CPT | Performed by: INTERNAL MEDICINE

## 2023-06-12 PROCEDURE — 81002 URINALYSIS NONAUTO W/O SCOPE: CPT | Performed by: INTERNAL MEDICINE

## 2023-06-12 PROCEDURE — 3078F DIAST BP <80 MM HG: CPT | Performed by: INTERNAL MEDICINE

## 2023-06-12 PROCEDURE — 99214 OFFICE O/P EST MOD 30 MIN: CPT | Performed by: INTERNAL MEDICINE

## 2023-06-12 PROCEDURE — 36415 COLL VENOUS BLD VENIPUNCTURE: CPT | Performed by: INTERNAL MEDICINE

## 2023-06-12 PROCEDURE — 1160F RVW MEDS BY RX/DR IN RCRD: CPT | Performed by: INTERNAL MEDICINE

## 2023-06-12 PROCEDURE — 3074F SYST BP LT 130 MM HG: CPT | Performed by: INTERNAL MEDICINE

## 2023-06-12 RX ORDER — GABAPENTIN 300 MG/1
900 CAPSULE ORAL NIGHTLY
Qty: 90 CAPSULE | Refills: 0 | Status: SHIPPED | OUTPATIENT
Start: 2023-06-12

## 2023-06-12 NOTE — PROGRESS NOTES
Venipuncture Blood Specimen Collection  Venipuncture performed in right arm by Amie Hart MA with good hemostasis. Patient tolerated the procedure well without complications.   06/12/23   Amie Hart MA

## 2023-06-12 NOTE — PROGRESS NOTES
Follow Up Office Visit      Date: 2023   Patient Name: Kymberly Real  : 1950   MRN: [id number]     Chief Complaint:    Chief Complaint   Patient presents with    Follow-up       History of Present Illness: Kymberly Real is a 73 y.o. female who is here today for routine follow-up, last visit being a complete physical in 2022.  She has chronic lumbago with bilateral sciatica going down to both feet, right greater than left, status post L5-S1 microdiscectomy in 2021, unfortunately having significant ongoing pain despite having been recently prescribed gabapentin 300 mg at 1 tablet morning, 1 midday, 3 at bedtime, not having any relief of pain with this medication and simply taking the 3 tablets now at night does give her some relief of her restless legs in addition to Requip 1 mg nightly.  She also takes doxepin 50 mg nightly.  She had been referred to pain management and has received a single injection series in her lumbar spine about 3 weeks ago which thus far has not improved her pain.  She does have fibromyalgia with what she describes as extensive pressure-point discomfort.  History of hypertension not checking blood pressures taking her lisinopril 10 mg daily, chronic kidney disease 3 with most recent creatinine 1.19 and GFR 49 in 2022 on lisinopril, also history of pravastatin 40 mg nightly having a satisfactory lipid profile in 2022.  She had been a cigarette smoker most of her life, having stopped for period of time but then restarted, last visit having been smoking about 5 cigarettes daily but indicates she stopped smoking completely 1 month ago.  History of low-dose screening chest CT stable from 2022.  She does have a history of COPD but currently is not having any symptoms including resolution of her cough since she stopped smoking..  The patient does note some pressure when she urinates with occasional dysuria but no hematuria.  No incontinence.    Subjective     "  Review of Systems:   Review of Systems    I have reviewed the patients family history, social history, past medical history, past surgical history and have updated it as appropriate.     Medications:     Current Outpatient Medications:     albuterol sulfate  (90 Base) MCG/ACT inhaler, Inhale 2 puffs Every 4 (Four) Hours As Needed for Wheezing., Disp: 18 g, Rfl: 3    Ascorbic Acid (Vitamin C) 100 MG chewable tablet, Vitamin C, Disp: , Rfl:     Calcium Carbonate-Vitamin D 600-10 MG-MCG per tablet, Take 1 tablet by mouth 2 (Two) Times a Day., Disp: , Rfl:     doxepin (SINEquan) 50 MG capsule, TAKE 2 CAPSULES BY MOUTH AT BEDTIME, Disp: 180 capsule, Rfl: 0    gabapentin (NEURONTIN) 300 MG capsule, Take 3 capsules by mouth Every Night., Disp: 90 capsule, Rfl: 0    lisinopril (PRINIVIL,ZESTRIL) 10 MG tablet, Take 1 tablet by mouth once daily, Disp: 90 tablet, Rfl: 0    Omega-3 Fatty Acids (fish oil) 1000 MG capsule capsule, Fish Oil, Disp: , Rfl:     pravastatin (PRAVACHOL) 40 MG tablet, TAKE 1 TABLET BY MOUTH ONCE DAILY AT BEDTIME, Disp: 90 tablet, Rfl: 0    rOPINIRole (REQUIP) 1 MG tablet, TAKE 1 TABLET BY MOUTH AT BEDTIME, Disp: 90 tablet, Rfl: 0    Allergies:   Allergies   Allergen Reactions    Penicillins Hives    Cefdinir Rash       Objective     Physical Exam: Please see above  Vital Signs:   Vitals:    06/12/23 1307   BP: 119/74   BP Location: Left arm   Patient Position: Sitting   Cuff Size: Adult   Pulse: 81   Temp: 97.6 °F (36.4 °C)   TempSrc: Temporal   SpO2: 99%   Weight: 63.7 kg (140 lb 6.4 oz)   Height: 152.4 cm (60\")     Body mass index is 27.42 kg/m².  BMI is >= 25 and <30. (Overweight) The following options were offered after discussion;: exercise counseling/recommendations and nutrition counseling/recommendations       Physical Exam  General: Smaller statured minimally overweight healthy-appearing 73-year-old female, BMI 27.4 with weight essentially unchanged over the last 7 months.  Neck supple " with no masses or tenderness  Lungs clear with no wheeze tachypnea or cough  Cardiac regular rate rhythm with no murmurs gallops or rubs, no dependent edema  Abdominal exam soft and nontender other than some pressure discomfort in the suprapubic region indicating she has to urinate, normal active bowel sounds  Musculoskeletal exam reveals moderate tenderness to palpation in her mid to lower lumbar region bilaterally, with straight leg raise negative in the seated position bilaterally, difficult to elicit ankle jerks and knee jerks bilaterally and symmetrically with subjective decrease in sensation in both feet to fine touch vibration and pinprick, motor exam normal  Procedures    Results:   Labs:   Hemoglobin A1C   Date Value Ref Range Status   11/04/2022 5.5 4.8 - 5.6 % Final     Comment:              Prediabetes: 5.7 - 6.4           Diabetes: >6.4           Glycemic control for adults with diabetes: <7.0       TSH   Date Value Ref Range Status   11/04/2022 1.790 0.450 - 4.500 uIU/mL Final        POCT Results (if applicable):   Results for orders placed or performed in visit on 06/12/23   POC Urinalysis Dipstick    Specimen: Urine   Result Value Ref Range    Color Yellow Yellow, Straw, Dark Yellow, [place]    Clarity, UA Clear Clear    Glucose, UA Negative Negative mg/dL    Bilirubin Negative Negative    Ketones, UA Negative Negative    Specific Gravity  1.010 1.005 - 1.030    Blood, UA Negative Negative    pH, Urine 6.0 5.0 - 8.0    Protein, POC Negative Negative mg/dL    Urobilinogen, UA 0.2 E.U./dL Normal, 0.2 E.U./dL    Leukocytes Trace (A) Negative    Nitrite, UA Negative Negative       Imaging:   No valid procedures specified.       Assessment / Plan      Assessment/Plan:   Diagnoses and all orders for this visit:    1. Essential (primary) hypertension (Primary)  Blood pressure control acutely, chronic control unknown taking lisinopril 10 mg daily.  Continue current regimen advising patient start monitoring  blood pressures regularly.  2. Dyslipidemia  Satisfactory lipid profile in 11/2022 taking pravastatin 40 mg nightly.  3. Stage 3a chronic kidney disease  Most recent creatinine 1.19 with GFR 49 in 11/2022 on lisinopril 10 mg daily.  Update BMP.  4.  Chronic bilateral low back pain with right-sided sciatica  Chronic intractable back pain taking currently gabapentin 300 mg at 3 tablets nightly, having discontinued her to tablets during the daytime given lack of benefit, also taking doxepin 50 mg nightly.  She did recently undergo a series of injections in her lumbar spine thus far not helping her pain and is to have more injections in the future.  Not a candidate for NSAIDs given her chronic kidney disease.  Previously had taken some opiates which did not help her symptoms.  I did discuss with her consideration of a trial of increasing doxepin from 50 mg up to 100 mg nightly but she declines.  5. Chronic bilateral low back pain with left-sided sciatica  See above  6.  Neuropathy involving both lower extremities  See above.  Continue gabapentin 300 mg at 3 tablets nightly, no longer taking 1 in the morning 1 in the afternoon for lack of therapeutic benefit  7. Fibromyalgia  See above, clinically stable on her doxepin and gabapentin.  8. Pulmonary emphysema, unspecified emphysema type  Currently asymptomatic.  9. Cigarette nicotine dependence in remission  Cigarette smoking now for approximately 1 month, most recent low-dose screening chest CT from 12/2022 stable, 1 year follow-up recommended.  I applauded her smoking cessation.  10. Overweight (BMI 25.0-29.9)  Emphasized need for healthy lifestyle with diet, and exercise as able.  11.  Suprapubic pressure  Urinalysis revealing trace clinically insignificant leukocytes otherwise normal.  Observation recommended.      Follow Up:   Return in about 5 months (around 11/12/2023).      At [facility], we believe that sharing information builds trust and better  relationships. You are receiving this note because you recently visited [facility]. It is possible you will see health information before a provider has talked with you about it. This kind of information can be easy to misunderstand. To help you fully understand what it means for your health, we urge you to discuss this note with your provider.    Kai Coats MD  [facility]

## 2023-06-13 LAB
BUN SERPL-MCNC: 37 MG/DL (ref 8–27)
BUN/CREAT SERPL: 31 (ref 12–28)
CALCIUM SERPL-MCNC: 9.8 MG/DL (ref 8.7–10.3)
CHLORIDE SERPL-SCNC: 102 MMOL/L (ref 96–106)
CO2 SERPL-SCNC: 21 MMOL/L (ref 20–29)
CREAT SERPL-MCNC: 1.21 MG/DL (ref 0.57–1)
EGFRCR SERPLBLD CKD-EPI 2021: 48 ML/MIN/1.73
GLUCOSE SERPL-MCNC: 96 MG/DL (ref 70–99)
POTASSIUM SERPL-SCNC: 5.1 MMOL/L (ref 3.5–5.2)
SODIUM SERPL-SCNC: 139 MMOL/L (ref 134–144)

## 2023-06-13 RX ORDER — PRAVASTATIN SODIUM 40 MG
40 TABLET ORAL
Qty: 90 TABLET | Refills: 2 | Status: SHIPPED | OUTPATIENT
Start: 2023-06-13

## 2023-06-13 RX ORDER — ROPINIROLE 1 MG/1
1 TABLET, FILM COATED ORAL
Qty: 90 TABLET | Refills: 2 | Status: SHIPPED | OUTPATIENT
Start: 2023-06-13

## 2023-06-13 RX ORDER — CHLORAL HYDRATE 500 MG
1000 CAPSULE ORAL
Qty: 90 CAPSULE | Refills: 2 | Status: SHIPPED | OUTPATIENT
Start: 2023-06-13

## 2023-06-13 RX ORDER — LISINOPRIL 10 MG/1
10 TABLET ORAL DAILY
Qty: 90 TABLET | Refills: 2 | Status: SHIPPED | OUTPATIENT
Start: 2023-06-13

## 2023-06-13 RX ORDER — DOXEPIN HYDROCHLORIDE 50 MG/1
100 CAPSULE ORAL
Qty: 180 CAPSULE | Refills: 2 | Status: SHIPPED | OUTPATIENT
Start: 2023-06-13

## 2023-06-13 RX ORDER — ALBUTEROL SULFATE 90 UG/1
2 AEROSOL, METERED RESPIRATORY (INHALATION) EVERY 4 HOURS PRN
Qty: 18 G | Refills: 3 | Status: SHIPPED | OUTPATIENT
Start: 2023-06-13

## 2023-06-13 RX ORDER — ASCORBIC ACID 100 MG
100 TABLET,CHEWABLE ORAL DAILY
Qty: 90 EACH | Refills: 2 | Status: SHIPPED | OUTPATIENT
Start: 2023-06-13

## 2023-06-14 ENCOUNTER — TELEPHONE (OUTPATIENT)
Dept: FAMILY MEDICINE CLINIC | Facility: CLINIC | Age: 73
End: 2023-06-14
Payer: MEDICARE

## 2023-06-14 NOTE — TELEPHONE ENCOUNTER
----- Message from Kai Coats MD sent at 6/13/2023  5:47 PM EDT -----  Please advise patient that her recent basic metabolic profile reveals stable kidney function with no abnormalities otherwise.  She is to continue her current medication regimen.    I have spoke with her regarding her lab results. TF

## 2023-07-25 DIAGNOSIS — M79.7 FIBROMYALGIA: ICD-10-CM

## 2023-07-25 DIAGNOSIS — M54.42 CHRONIC BILATERAL LOW BACK PAIN WITH LEFT-SIDED SCIATICA: ICD-10-CM

## 2023-07-25 DIAGNOSIS — G89.29 CHRONIC BILATERAL LOW BACK PAIN WITH LEFT-SIDED SCIATICA: ICD-10-CM

## 2023-07-25 DIAGNOSIS — G57.93 NEUROPATHY INVOLVING BOTH LOWER EXTREMITIES: ICD-10-CM

## 2023-07-25 RX ORDER — GABAPENTIN 300 MG/1
CAPSULE ORAL
Qty: 150 CAPSULE | Refills: 0 | Status: SHIPPED | OUTPATIENT
Start: 2023-07-25

## 2023-07-25 NOTE — TELEPHONE ENCOUNTER
She was last seen in our office on 6/12/2023. She does have a follow up scheduled for 11/7/2023. TF

## 2023-08-30 DIAGNOSIS — M54.42 CHRONIC BILATERAL LOW BACK PAIN WITH LEFT-SIDED SCIATICA: ICD-10-CM

## 2023-08-30 DIAGNOSIS — G57.93 NEUROPATHY INVOLVING BOTH LOWER EXTREMITIES: ICD-10-CM

## 2023-08-30 DIAGNOSIS — M79.7 FIBROMYALGIA: ICD-10-CM

## 2023-08-30 DIAGNOSIS — G89.29 CHRONIC BILATERAL LOW BACK PAIN WITH LEFT-SIDED SCIATICA: ICD-10-CM

## 2023-08-30 NOTE — TELEPHONE ENCOUNTER
Her last visit in our office was on 6/12/2023. She does have a follow up scheduled for 11/7/2023. NAIF

## 2023-08-31 RX ORDER — GABAPENTIN 300 MG/1
CAPSULE ORAL
Qty: 150 CAPSULE | Refills: 0 | Status: SHIPPED | OUTPATIENT
Start: 2023-08-31

## 2023-10-10 DIAGNOSIS — M54.42 CHRONIC BILATERAL LOW BACK PAIN WITH LEFT-SIDED SCIATICA: ICD-10-CM

## 2023-10-10 DIAGNOSIS — G89.29 CHRONIC BILATERAL LOW BACK PAIN WITH LEFT-SIDED SCIATICA: ICD-10-CM

## 2023-10-10 DIAGNOSIS — M79.7 FIBROMYALGIA: ICD-10-CM

## 2023-10-10 DIAGNOSIS — G57.93 NEUROPATHY INVOLVING BOTH LOWER EXTREMITIES: ICD-10-CM

## 2023-10-10 RX ORDER — GABAPENTIN 300 MG/1
CAPSULE ORAL
Qty: 150 CAPSULE | Refills: 0 | Status: SHIPPED | OUTPATIENT
Start: 2023-10-10

## 2023-11-06 DIAGNOSIS — Z12.2 SCREENING FOR LUNG CANCER: ICD-10-CM

## 2023-11-06 DIAGNOSIS — F17.211 CIGARETTE NICOTINE DEPENDENCE IN REMISSION: Primary | ICD-10-CM

## 2023-11-06 RX ORDER — HYDROXYZINE HYDROCHLORIDE 25 MG/1
25 TABLET, FILM COATED ORAL EVERY 6 HOURS PRN
COMMUNITY
Start: 2023-10-26

## 2023-11-06 RX ORDER — HYDROCODONE BITARTRATE AND ACETAMINOPHEN 5; 325 MG/1; MG/1
1 TABLET ORAL EVERY 8 HOURS PRN
COMMUNITY
Start: 2023-10-25

## 2023-11-06 RX ORDER — CYCLOBENZAPRINE HCL 10 MG
10 TABLET ORAL
COMMUNITY
End: 2023-11-07

## 2023-11-07 ENCOUNTER — OFFICE VISIT (OUTPATIENT)
Dept: FAMILY MEDICINE CLINIC | Facility: CLINIC | Age: 73
End: 2023-11-07
Payer: MEDICARE

## 2023-11-07 VITALS
HEART RATE: 96 BPM | DIASTOLIC BLOOD PRESSURE: 74 MMHG | SYSTOLIC BLOOD PRESSURE: 131 MMHG | HEIGHT: 60 IN | WEIGHT: 149 LBS | OXYGEN SATURATION: 98 % | BODY MASS INDEX: 29.25 KG/M2 | TEMPERATURE: 98.6 F

## 2023-11-07 DIAGNOSIS — Z13.29 SCREENING FOR THYROID DISORDER: ICD-10-CM

## 2023-11-07 DIAGNOSIS — G89.29 CHRONIC BILATERAL LOW BACK PAIN WITH BILATERAL SCIATICA: ICD-10-CM

## 2023-11-07 DIAGNOSIS — K59.00 CONSTIPATION, UNSPECIFIED CONSTIPATION TYPE: ICD-10-CM

## 2023-11-07 DIAGNOSIS — M85.851 OSTEOPENIA OF BOTH HIPS: ICD-10-CM

## 2023-11-07 DIAGNOSIS — F17.210 CIGARETTE SMOKER: ICD-10-CM

## 2023-11-07 DIAGNOSIS — M79.7 FIBROMYALGIA: ICD-10-CM

## 2023-11-07 DIAGNOSIS — E78.5 DYSLIPIDEMIA: ICD-10-CM

## 2023-11-07 DIAGNOSIS — N18.31 STAGE 3A CHRONIC KIDNEY DISEASE: ICD-10-CM

## 2023-11-07 DIAGNOSIS — Z12.31 ENCOUNTER FOR SCREENING MAMMOGRAM FOR MALIGNANT NEOPLASM OF BREAST: ICD-10-CM

## 2023-11-07 DIAGNOSIS — M54.42 CHRONIC BILATERAL LOW BACK PAIN WITH BILATERAL SCIATICA: ICD-10-CM

## 2023-11-07 DIAGNOSIS — G25.81 RESTLESS LEG SYNDROME: ICD-10-CM

## 2023-11-07 DIAGNOSIS — Z86.010 HISTORY OF COLON POLYPS: ICD-10-CM

## 2023-11-07 DIAGNOSIS — M85.852 OSTEOPENIA OF BOTH HIPS: ICD-10-CM

## 2023-11-07 DIAGNOSIS — E66.3 OVERWEIGHT (BMI 25.0-29.9): ICD-10-CM

## 2023-11-07 DIAGNOSIS — I10 ESSENTIAL (PRIMARY) HYPERTENSION: ICD-10-CM

## 2023-11-07 DIAGNOSIS — Z12.2 SCREENING FOR LUNG CANCER: ICD-10-CM

## 2023-11-07 DIAGNOSIS — M54.41 CHRONIC BILATERAL LOW BACK PAIN WITH BILATERAL SCIATICA: ICD-10-CM

## 2023-11-07 DIAGNOSIS — J43.9 PULMONARY EMPHYSEMA, UNSPECIFIED EMPHYSEMA TYPE: ICD-10-CM

## 2023-11-07 DIAGNOSIS — E55.9 VITAMIN D DEFICIENCY: ICD-10-CM

## 2023-11-07 DIAGNOSIS — Z00.01 ENCOUNTER FOR GENERAL ADULT MEDICAL EXAMINATION WITH ABNORMAL FINDINGS: Primary | ICD-10-CM

## 2023-11-07 DIAGNOSIS — R73.9 HYPERGLYCEMIA: ICD-10-CM

## 2023-11-07 PROBLEM — Z86.0100 HISTORY OF COLON POLYPS: Status: ACTIVE | Noted: 2023-11-07

## 2023-11-07 RX ORDER — GABAPENTIN 600 MG/1
600 TABLET ORAL 3 TIMES DAILY
Qty: 90 TABLET | Refills: 5 | Status: SHIPPED | OUTPATIENT
Start: 2023-11-07

## 2023-11-07 RX ORDER — ALBUTEROL SULFATE 90 UG/1
2 AEROSOL, METERED RESPIRATORY (INHALATION) EVERY 4 HOURS PRN
Qty: 18 G | Refills: 3 | Status: SHIPPED | OUTPATIENT
Start: 2023-11-07

## 2023-11-07 RX ORDER — POLYETHYLENE GLYCOL 3350 17 G/17G
POWDER, FOR SOLUTION ORAL
Qty: 527 G | Refills: 5 | Status: SHIPPED | OUTPATIENT
Start: 2023-11-07

## 2023-11-07 NOTE — PROGRESS NOTES
Venipuncture Blood Specimen Collection  Venipuncture performed in right arm by Amie Hart MA with good hemostasis. Patient tolerated the procedure well without complications.   11/07/23   Amie Hart MA

## 2023-11-07 NOTE — PROGRESS NOTES
The ABCs of the Annual Wellness Visit  Subsequent Medicare Wellness Visit    Subjective    Kymberly Real is a 73 y.o. female who presents for a Subsequent Medicare Wellness Visit.    The following portions of the patient's history were reviewed and   updated as appropriate: allergies, current medications, past family history, past medical history, past social history, past surgical history, and problem list.    Compared to one year ago, the patient feels her physical   health is worse. More pain in back    Compared to one year ago, the patient feels her mental   health is the same.    Recent Hospitalizations:  She was not admitted to the hospital during the last year.       Current Medical Providers:  Patient Care Team:  Kai Coats MD as PCP - General (Internal Medicine)  Kai Coats MD as Referring Physician (Internal Medicine)    Outpatient Medications Prior to Visit   Medication Sig Dispense Refill    Calcium Carbonate-Vitamin D 600-10 MG-MCG per tablet Take 1 tablet by mouth 2 (Two) Times a Day. 180 tablet 2    doxepin (SINEquan) 50 MG capsule Take 2 capsules by mouth every night at bedtime. 180 capsule 2    HYDROcodone-acetaminophen (NORCO) 5-325 MG per tablet Take 1 tablet by mouth Every 8 (Eight) Hours As Needed for Moderate Pain or Severe Pain. for 30 days.      hydrOXYzine (ATARAX) 25 MG tablet Take 1 tablet by mouth Every 6 (Six) Hours As Needed.      lisinopril (PRINIVIL,ZESTRIL) 10 MG tablet Take 1 tablet by mouth Daily. 90 tablet 2    Omega-3 Fatty Acids (fish oil) 1000 MG capsule capsule Take 1 capsule by mouth Daily With Breakfast. 90 capsule 2    pravastatin (PRAVACHOL) 40 MG tablet Take 1 tablet by mouth every night at bedtime. 90 tablet 2    rOPINIRole (REQUIP) 1 MG tablet Take 1 tablet by mouth every night at bedtime. Take 1 hour before bedtime. 90 tablet 2    albuterol sulfate  (90 Base) MCG/ACT inhaler Inhale 2 puffs Every 4 (Four) Hours As Needed for Wheezing. 18 g 3     Ascorbic Acid (Vitamin C) 100 MG chewable tablet Chew 100 mg Daily. 90 each 2    cyclobenzaprine (FLEXERIL) 10 MG tablet Take 1 tablet by mouth every night at bedtime.      gabapentin (NEURONTIN) 300 MG capsule TAKE 1 CAPSULE BY MOUTH IN THE MORNING AND THEN 1 CAPSULE IN THE AFTERNOON AND THEN 3 CAPSULES AT BEDTIME (Patient taking differently: Takes 3 at night) 150 capsule 0     No facility-administered medications prior to visit.       Opioid medication/s are on active medication list.  and I have evaluated her active treatment plan and pain score trends (see table).  There were no vitals filed for this visit.  I have reviewed the chart for potential of high risk medication and harmful drug interactions in the elderly.          Aspirin is not on active medication list.  Aspirin use is not indicated based on review of current medical condition/s. Risk of harm outweighs potential benefits.  .    Patient Active Problem List   Diagnosis    Chronic cough    COPD (chronic obstructive pulmonary disease)    Disorder of bone density and structure, unspecified    Dyslipidemia    Essential (primary) hypertension    Fibromyalgia    History of migraine headaches    Lumbago with sciatica, left side    Lumbago with sciatica, right side    Osteopenia    Overweight (BMI 25.0-29.9)    Restless leg syndrome    Cigarette smoker    Stage 3a chronic kidney disease    Cigarette nicotine dependence in remission    Suprapubic pressure    Chronic bilateral low back pain with bilateral sciatica    Hyperglycemia    Encounter for general adult medical examination with abnormal findings    Vitamin D deficiency    Screening for thyroid disorder    Screening for lung cancer    Encounter for screening mammogram for malignant neoplasm of breast    History of colon polyps    Constipation     Advance Care Planning   Advance Care Planning     Advance Directive is not on file.  ACP discussion was held with the patient during this visit. Patient does  "not have an advance directive, information provided.     Objective    Vitals:    23 1302   BP: 131/74   BP Location: Left arm   Patient Position: Sitting   Cuff Size: Adult   Pulse: 96   Temp: 98.6 °F (37 °C)   TempSrc: Temporal   SpO2: 98%   Weight: 67.6 kg (149 lb)   Height: 152.4 cm (60\")     Estimated body mass index is 29.1 kg/m² as calculated from the following:    Height as of this encounter: 152.4 cm (60\").    Weight as of this encounter: 67.6 kg (149 lb).           Does the patient have evidence of cognitive impairment? No          HEALTH RISK ASSESSMENT    Smoking Status:  Social History     Tobacco Use   Smoking Status Every Day    Packs/day: 0.25    Years: 50.00    Additional pack years: 0.00    Total pack years: 12.50    Types: Cigarettes   Smokeless Tobacco Never   Tobacco Comments    IN ADVANCED MD SAYS NON SMOKER     Alcohol Consumption:  Social History     Substance and Sexual Activity   Alcohol Use Not Currently    Comment: [facility]     Fall Risk Screen:    DOUG Fall Risk Assessment was completed, and patient is at LOW risk for falls.Assessment completed on:2023    Depression Screenin/7/2023     1:04 PM   PHQ-2/PHQ-9 Depression Screening   Little Interest or Pleasure in Doing Things 0-->not at all   Feeling Down, Depressed or Hopeless 0-->not at all   PHQ-9: Brief Depression Severity Measure Score 0       Health Habits and Functional and Cognitive Screenin/7/2023     1:05 PM   Functional & Cognitive Status   Do you have difficulty preparing food and eating? No   Do you have difficulty bathing yourself, getting dressed or grooming yourself? No   Do you have difficulty using the toilet? No   Do you have difficulty moving around from place to place? Yes   Do you have trouble with steps or getting out of a bed or a chair? No   Current Diet Well Balanced Diet   Dental Exam Up to date   Eye Exam Not up to date   Exercise (times per week) 0 times per week   Current " Exercises Include House Cleaning;Walking   Do you need help using the phone?  No   Are you deaf or do you have serious difficulty hearing?  Yes   Do you need help to go to places out of walking distance? No   Do you need help shopping? No   Do you need help preparing meals?  No   Do you need help with housework?  No   Do you need help with laundry? No   Do you need help taking your medications? No   Do you need help managing money? No   Do you ever drive or ride in a car without wearing a seat belt? No   Have you felt unusual stress, anger or loneliness in the last month? No   Who do you live with? Alone   If you need help, do you have trouble finding someone available to you? No   Have you been bothered in the last four weeks by sexual problems? No   Do you have difficulty concentrating, remembering or making decisions? No       Age-appropriate Screening Schedule:  Refer to the list below for future screening recommendations based on patient's age, sex and/or medical conditions. Orders for these recommended tests are listed in the plan section. The patient has been provided with a written plan.    Health Maintenance   Topic Date Due    ZOSTER VACCINE (2 of 3) 12/08/2017    MAMMOGRAM  10/13/2023    LIPID PANEL  11/04/2023    BMI FOLLOWUP  06/12/2024    ANNUAL WELLNESS VISIT  11/07/2024    DXA SCAN  12/07/2024    COLORECTAL CANCER SCREENING  05/25/2025    TDAP/TD VACCINES (2 - Td or Tdap) 07/13/2025    HEPATITIS C SCREENING  Completed    COVID-19 Vaccine  Completed    INFLUENZA VACCINE  Completed    Pneumococcal Vaccine 65+  Completed                  CMS Preventative Services Quick Reference  Risk Factors Identified During Encounter  Chronic Pain:  sees pain management  Hearing Problem:  has hearing aides  Immunizations Discussed/Encouraged: Shingrix and RSV (Respiratory Syncytial Virus)  Tobacco Use/Dependance Risk (use dotphrase .tobaccocessation for documentation)  Dental Screening Recommended  Vision Screening  Recommended  The above risks/problems have been discussed with the patient.  Pertinent information has been shared with the patient in the After Visit Summary.  An After Visit Summary and PPPS were made available to the patient.    Follow Up:   Next Medicare Wellness visit to be scheduled in 1 year.       Additional E&M Note during same encounter follows:  Patient has multiple medical problems which are significant and separately identifiable that require additional work above and beyond the Medicare Wellness Visit.      Chief Complaint  Annual Exam    Subjective        HPI  Kymberly Real is also being seen today for routine general review.  Patient has a primary complaint of chronic low back pain with intermittent sciatica, this despite taking gabapentin 300 mg at 3 tablets nightly, and doxepin 50 mg nightly, having been seen by pain management, having had injections and nerve ablations in her back without benefit, the only thing helping is transient doses of prednisone.  She also has fibromyalgia which is satisfactorily controlled but still has some breakthrough symptoms on the above regimen.  Has RLS well treated with her Requip 1 mg nightly, hypertension taking lisinopril 10 mg daily with blood pressures in the 100s to 130s over 70s to 80s denying chest pains palpitations dizziness or edema.  She does have very mild COPD with rare symptoms of dyspnea using albuterol inhaler alone, total 40-pack-year history currently having reduced down to 4 to 5 cigarettes daily, indicating stress precludes her completely stopping.  She also has a history of chronic constipation, typically using an over-the-counter stool softener moving her bowels every other day with some straining.  Rarely has mild diet related heartburn, denies any nausea vomiting diarrhea melena or hematochezia.  No /GYN complaints.  Does have some chronic hearing loss and has hearing aids which she does not like to use, occasionally has a headache and  "some visual change attributed to cataracts with impending surgery.  Occasionally has some mild dysthymia but not a major problem, no anxiety.  Periodic health maintenance includes need for updated low-dose screening chest CT and mammogram, most recent colonoscopy from 5/2022 revealing polyps with a 3-year follow-up recommended correlating to 5/2025, DEXA scan stable from 11/2022 with 2-year follow-up recommended correlating to 11/2024, Shingrix vaccine recommended having had previous Zostavax, RSV vaccine recommended, due for updated labs.         Objective   Vital Signs:  /74 (BP Location: Left arm, Patient Position: Sitting, Cuff Size: Adult)   Pulse 96   Temp 98.6 °F (37 °C) (Temporal)   Ht 152.4 cm (60\")   Wt 67.6 kg (149 lb)   SpO2 98%   BMI 29.10 kg/m²     Physical Exam  Vitals and nursing note reviewed. Exam conducted with a chaperone present.   Constitutional:       Appearance: Normal appearance.      Comments: Pleasant smaller statured healthy appearing female in no acute distress, BMI of 29.1   HENT:      Head: Normocephalic and atraumatic.      Right Ear: Tympanic membrane, ear canal and external ear normal.      Left Ear: Tympanic membrane, ear canal and external ear normal.      Nose: Nose normal.      Mouth/Throat:      Mouth: Mucous membranes are moist.      Pharynx: Oropharynx is clear.      Comments: Does have some gingivitis and dental decay with multiple missing teeth, dental malalignment especially in her mandible anteriorly, mucous membranes otherwise moist and clear  Eyes:      Extraocular Movements: Extraocular movements intact.      Conjunctiva/sclera: Conjunctivae normal.      Pupils: Pupils are equal, round, and reactive to light.   Neck:      Vascular: No carotid bruit.      Comments: No cervical adenopathy masses or tenderness, no periclavicular or axillary or inguinal adenopathy, full cervical range of motion  Cardiovascular:      Rate and Rhythm: Normal rate and regular " rhythm.      Pulses: Normal pulses.      Heart sounds: Normal heart sounds. No murmur heard.     No friction rub. No gallop.      Comments: 2+ carotids without bruits, 2+ radial pulses, 2+ femoral pulses without bruits, 2+ bipedal pulses with good perfusion and no edema  Pulmonary:      Effort: Pulmonary effort is normal.      Breath sounds: Normal breath sounds.      Comments: No cough wheeze or dyspnea  Chest:   Breasts:     Kaushal Score is 5.      Breasts are symmetrical.      Right: Normal. No swelling, bleeding, inverted nipple, mass, nipple discharge, skin change or tenderness.      Left: Normal. No swelling, bleeding, inverted nipple, mass, nipple discharge, skin change or tenderness.   Abdominal:      General: Bowel sounds are normal.      Palpations: Abdomen is soft.      Comments: Nontender nondistended with no organomegaly or masses   Genitourinary:     Comments: Pelvic and rectal exams deferred given advanced age with low risk and lack of related complaints  Musculoskeletal:         General: No swelling, tenderness or deformity. Normal range of motion.      Cervical back: Normal range of motion and neck supple. No rigidity or tenderness.      Right lower leg: No edema.      Left lower leg: No edema.   Lymphadenopathy:      Cervical: No cervical adenopathy.      Upper Body:      Right upper body: No supraclavicular or axillary adenopathy.      Left upper body: No supraclavicular or axillary adenopathy.   Skin:     General: Skin is warm and dry.      Capillary Refill: Capillary refill takes less than 2 seconds.      Findings: No lesion or rash.   Neurological:      General: No focal deficit present.      Mental Status: She is alert and oriented to person, place, and time. Mental status is at baseline.      Cranial Nerves: No cranial nerve deficit.      Sensory: No sensory deficit.      Motor: No weakness.      Coordination: Coordination normal.   Psychiatric:         Mood and Affect: Mood normal.          Behavior: Behavior normal.         Thought Content: Thought content normal.         Judgment: Judgment normal.            ECG 12 Lead    Date/Time: 11/7/2023 1:53 PM  Performed by: Kai Coats MD    Authorized by: Kai Coats MD  Comparison: compared with previous ECG from 10/29/2021  Similar to previous ECG  Comparison to previous ECG: Normal sinus rhythm rate 91, IVCD, no ischemia, no significant change from prior tracing other than no PVCs noted              Assessment and Plan   Diagnoses and all orders for this visit:    1. Encounter for general adult medical examination with abnormal findings (Primary)  -     TSH Rfx On Abnormal To Free T4; Future  -     Comprehensive Metabolic Panel; Future  -     Lipid Panel; Future  -     Hemoglobin A1c; Future  -     Vitamin D,25-Hydroxy; Future  -     Urinalysis With Culture If Indicated -; Future  -     Gabapentin, Urine -; Future  -     gabapentin (NEURONTIN) 600 MG tablet; Take 1 tablet by mouth 3 (Three) Times a Day.  Dispense: 90 tablet; Refill: 5  -     ECG 12 Lead  -     Gabapentin, Urine -  -     Urinalysis With Culture If Indicated -  -     Vitamin D,25-Hydroxy  -     Hemoglobin A1c  -     Lipid Panel  -     Comprehensive Metabolic Panel  -     TSH Rfx On Abnormal To Free T4  Pleasant 73-year-old female, chronic low back pain with intermittent sciatica followed by pain management, biggest complaint is her pain as detailed and discussed below,.  Health maintenance includes need for updated laboratory testing, low-dose screening chest CT, noting colonoscopy and DEXA scan currently current, does not require Pap smears given low risk profile due to benign history and advanced age.  2. Hyperglycemia  -     Hemoglobin A1c; Future  -     Hemoglobin A1c    3. Essential (primary) hypertension  -     Comprehensive Metabolic Panel; Future  -     Urinalysis With Culture If Indicated -; Future  -     ECG 12 Lead  -     Urinalysis With Culture If Indicated -  -      Comprehensive Metabolic Panel  EKG today unremarkable.  Very satisfactory blood pressure control acutely as well as chronically by history taking lisinopril 10 mg daily.  4. Dyslipidemia  -     Lipid Panel; Future  -     Lipid Panel  Taking pravastatin 40 mg nightly and fish oil supplementation.  Update lipid profile.  5. Stage 3a chronic kidney disease  -     Comprehensive Metabolic Panel; Future  -     Comprehensive Metabolic Panel  Most recent creatinine 1.21 with GFR 48 in 6/2023, taking lisinopril.  Update renal function  6. Overweight (BMI 25.0-29.9)  Encouraged regarding need for regular exercise and healthy diet for calorie restriction and weight loss.  7. Vitamin D deficiency  -     Vitamin D,25-Hydroxy; Future  -     Vitamin D,25-Hydroxy    8. Fibromyalgia  -     gabapentin (NEURONTIN) 600 MG tablet; Take 1 tablet by mouth 3 (Three) Times a Day.  Dispense: 90 tablet; Refill: 5  Patient with chronic fibromyalgia having some mild breakthrough symptoms taking gabapentin 300 mg at 3 tablets nightly and doxepin 50 mg nightly.  Will adjust patient's regimen and increase her total dose switching over to gabapentin 600 mg 3 times daily, with side effect profile potential discussed.  Reassess clinically in 1 month.  Controlled substance agreement signed today, check gabapentin urine screen.  9. Chronic bilateral low back pain with bilateral sciatica  -     Gabapentin, Urine -; Future  -     gabapentin (NEURONTIN) 600 MG tablet; Take 1 tablet by mouth 3 (Three) Times a Day.  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  We have discussed increasing trial of adjusting her gabapentin from a total of 900 mg nightly, over to 600 mg 3 times daily with side effect  profile potential discussed, reassessing clinically in 1 month.  10. Restless leg syndrome  Doing well with Requip 1 mg nightly, also benefited by her gabapentin  11. Osteopenia of both hips  Does take calcium with D twice daily.  Update DEXA scan next year.  12. Pulmonary emphysema, unspecified emphysema type  -     albuterol sulfate  (90 Base) MCG/ACT inhaler; Inhale 2 puffs Every 4 (Four) Hours As Needed for Wheezing.  Dispense: 18 g; Refill: 3  Rare symptoms, only requiring albuterol as needed.  Advised to stop smoking.  13. Cigarette smoker  -      CT Chest Low Dose Cancer Screening WO; Future  40-pack-year history of smoking, having stopped for a number of years but restarted subsequently several years ago after her  passed away.  Strongly advised need to discontinue smoking habit for health risk reduction.  Update low-dose screening chest CT.  14. Constipation, unspecified constipation type       -      MiraLAX  Use MiraLAX titrating anywhere from 1/2-1 capful daily in order to maintain 1-2 soft bowel movements daily.  15. History of colon polyps  Most recent colonoscopy in 5/2022 with a 3-year follow-up recommended correlating to 5/2025, this given multiple polyps.  16. Screening for thyroid disorder  -     TSH Rfx On Abnormal To Free T4; Future  -     TSH Rfx On Abnormal To Free T4    17. Screening for lung cancer  -      CT Chest Low Dose Cancer Screening WO; Future  Arrange for yearly low-dose screening chest CT given her ongoing cigarette habit.  18. Encounter for screening mammogram for malignant neoplasm of breast  -     Mammo Screening Bilateral With CAD; Future  Patient has already scheduled her mammogram for next week on 11/13/2023.    50 minutes spent with patient reviewing her history, performing physical exam, formulation of treatment plan and updating EMR.         Follow Up   Return in about 1 month (around 12/7/2023) for Recheck.  Patient was given instructions and counseling  regarding her condition or for health maintenance advice. Please see specific information pulled into the AVS if appropriate.

## 2023-11-08 LAB
25(OH)D3+25(OH)D2 SERPL-MCNC: 39.2 NG/ML (ref 30–100)
ALBUMIN SERPL-MCNC: 4.6 G/DL (ref 3.8–4.8)
ALBUMIN/GLOB SERPL: 1.9 {RATIO} (ref 1.2–2.2)
ALP SERPL-CCNC: 64 IU/L (ref 44–121)
ALT SERPL-CCNC: 18 IU/L (ref 0–32)
AST SERPL-CCNC: 17 IU/L (ref 0–40)
BILIRUB SERPL-MCNC: 0.3 MG/DL (ref 0–1.2)
BUN SERPL-MCNC: 30 MG/DL (ref 8–27)
BUN/CREAT SERPL: 25 (ref 12–28)
CALCIUM SERPL-MCNC: 10 MG/DL (ref 8.7–10.3)
CHLORIDE SERPL-SCNC: 99 MMOL/L (ref 96–106)
CHOLEST SERPL-MCNC: 180 MG/DL (ref 100–199)
CO2 SERPL-SCNC: 23 MMOL/L (ref 20–29)
CREAT SERPL-MCNC: 1.19 MG/DL (ref 0.57–1)
EGFRCR SERPLBLD CKD-EPI 2021: 49 ML/MIN/1.73
GLOBULIN SER CALC-MCNC: 2.4 G/DL (ref 1.5–4.5)
GLUCOSE SERPL-MCNC: 87 MG/DL (ref 70–99)
HBA1C MFR BLD: 5.6 % (ref 4.8–5.6)
HDLC SERPL-MCNC: 48 MG/DL
LDLC SERPL CALC-MCNC: 79 MG/DL (ref 0–99)
POTASSIUM SERPL-SCNC: 3.9 MMOL/L (ref 3.5–5.2)
PROT SERPL-MCNC: 7 G/DL (ref 6–8.5)
SODIUM SERPL-SCNC: 140 MMOL/L (ref 134–144)
TRIGL SERPL-MCNC: 329 MG/DL (ref 0–149)
TSH SERPL DL<=0.005 MIU/L-ACNC: 1.24 UIU/ML (ref 0.45–4.5)
VLDLC SERPL CALC-MCNC: 53 MG/DL (ref 5–40)

## 2023-11-12 LAB
APPEARANCE UR: CLEAR
BACTERIA #/AREA URNS HPF: ABNORMAL /[HPF]
BACTERIA UR CULT: ABNORMAL
BACTERIA UR CULT: ABNORMAL
BILIRUB UR QL STRIP: NEGATIVE
CASTS URNS QL MICRO: ABNORMAL /LPF
COLOR UR: YELLOW
EPI CELLS #/AREA URNS HPF: ABNORMAL /HPF (ref 0–10)
GABAPENTIN UR-MCNC: 105.6 UG/ML
GLUCOSE UR QL STRIP: NEGATIVE
HGB UR QL STRIP: NEGATIVE
KETONES UR QL STRIP: NEGATIVE
LEUKOCYTE ESTERASE UR QL STRIP: ABNORMAL
MICRO URNS: ABNORMAL
NITRITE UR QL STRIP: NEGATIVE
OTHER ANTIBIOTIC SUSC ISLT: ABNORMAL
PH UR STRIP: 6 [PH] (ref 5–7.5)
PROT UR QL STRIP: NEGATIVE
RBC #/AREA URNS HPF: ABNORMAL /HPF (ref 0–2)
SP GR UR STRIP: 1.01 (ref 1–1.03)
URINALYSIS REFLEX: ABNORMAL
UROBILINOGEN UR STRIP-MCNC: 0.2 MG/DL (ref 0.2–1)
WBC #/AREA URNS HPF: ABNORMAL /HPF (ref 0–5)

## 2023-11-13 DIAGNOSIS — N30.00 ACUTE CYSTITIS WITHOUT HEMATURIA: Primary | ICD-10-CM

## 2023-11-13 RX ORDER — SULFAMETHOXAZOLE AND TRIMETHOPRIM 800; 160 MG/1; MG/1
1 TABLET ORAL 2 TIMES DAILY
Qty: 10 TABLET | Refills: 0 | Status: SHIPPED | OUTPATIENT
Start: 2023-11-13 | End: 2023-11-18

## 2023-11-14 ENCOUNTER — TELEPHONE (OUTPATIENT)
Dept: FAMILY MEDICINE CLINIC | Facility: CLINIC | Age: 73
End: 2023-11-14
Payer: MEDICARE

## 2023-11-14 NOTE — TELEPHONE ENCOUNTER
----- Message from Kai Coats MD sent at 11/13/2023  5:44 PM EST -----  Please advise patient that her recently submitted laboratory testing was satisfactory including her cholesterol profile, chemistry profile including kidney function being stable, vitamin D normal, hemoglobin A1c or diabetic screen normal, thyroid testing normal, with urinalysis indicating an acute cystitis/bladder infection for which we will prescribe her Bactrim at her local pharmacy.  She is to push plenty of fluids and advise of any ongoing related problems.  Otherwise continue current regimen.      I have spoke with her regarding her lab results. TF

## 2023-11-26 DIAGNOSIS — G89.29 CHRONIC BILATERAL LOW BACK PAIN WITH LEFT-SIDED SCIATICA: ICD-10-CM

## 2023-11-26 DIAGNOSIS — M79.7 FIBROMYALGIA: ICD-10-CM

## 2023-11-26 DIAGNOSIS — M54.42 CHRONIC BILATERAL LOW BACK PAIN WITH LEFT-SIDED SCIATICA: ICD-10-CM

## 2023-11-26 DIAGNOSIS — G57.93 NEUROPATHY INVOLVING BOTH LOWER EXTREMITIES: ICD-10-CM

## 2023-11-27 ENCOUNTER — TELEPHONE (OUTPATIENT)
Dept: FAMILY MEDICINE CLINIC | Facility: CLINIC | Age: 73
End: 2023-11-27
Payer: MEDICARE

## 2023-11-27 RX ORDER — GABAPENTIN 300 MG/1
CAPSULE ORAL
Qty: 150 CAPSULE | Refills: 0 | Status: SHIPPED | OUTPATIENT
Start: 2023-11-27

## 2023-11-27 NOTE — TELEPHONE ENCOUNTER
----- Message from Kai Coats MD sent at 11/27/2023  1:46 PM EST -----  Please advise patient that her recent low-dose screening chest CT revealed no concerning findings for lung cancer, though she did have some emphysematous changes in her lungs.  She is strongly advised to stop smoking as previously counseled, and to repeat another low-dose screening chest CT in 1 year.

## 2023-11-30 ENCOUNTER — TELEPHONE (OUTPATIENT)
Dept: FAMILY MEDICINE CLINIC | Facility: CLINIC | Age: 73
End: 2023-11-30
Payer: MEDICARE

## 2023-11-30 NOTE — TELEPHONE ENCOUNTER
I have tried to call her regarding her results with no answer and no voice mail set up. @ 1:53 TF    I have tried to call with no answer. 3:30 TF    I have tried to call with no answer. 4:36 TF

## 2023-11-30 NOTE — TELEPHONE ENCOUNTER
----- Message from Kai Coats MD sent at 11/30/2023  9:22 AM EST -----  Patient that her recent bilateral screening mammogram was normal, and that she would be advised to repeat another mammogram in 1 year.

## 2023-12-01 ENCOUNTER — TELEPHONE (OUTPATIENT)
Dept: FAMILY MEDICINE CLINIC | Facility: CLINIC | Age: 73
End: 2023-12-01
Payer: MEDICARE

## 2023-12-01 NOTE — TELEPHONE ENCOUNTER
I have tried to call her with no answer. @10:52 TF    I have tried to call her with no answer @ 2:14 TF

## 2023-12-07 ENCOUNTER — OFFICE VISIT (OUTPATIENT)
Dept: FAMILY MEDICINE CLINIC | Facility: CLINIC | Age: 73
End: 2023-12-07
Payer: MEDICARE

## 2023-12-07 VITALS
HEIGHT: 60 IN | HEART RATE: 86 BPM | WEIGHT: 147.8 LBS | OXYGEN SATURATION: 100 % | SYSTOLIC BLOOD PRESSURE: 108 MMHG | BODY MASS INDEX: 29.02 KG/M2 | TEMPERATURE: 98.6 F | DIASTOLIC BLOOD PRESSURE: 69 MMHG

## 2023-12-07 DIAGNOSIS — M79.7 FIBROMYALGIA: Primary | ICD-10-CM

## 2023-12-07 DIAGNOSIS — M54.42 CHRONIC BILATERAL LOW BACK PAIN WITH BILATERAL SCIATICA: ICD-10-CM

## 2023-12-07 DIAGNOSIS — F17.210 CIGARETTE SMOKER: ICD-10-CM

## 2023-12-07 DIAGNOSIS — G89.29 CHRONIC BILATERAL LOW BACK PAIN WITH BILATERAL SCIATICA: ICD-10-CM

## 2023-12-07 DIAGNOSIS — M54.41 CHRONIC BILATERAL LOW BACK PAIN WITH BILATERAL SCIATICA: ICD-10-CM

## 2023-12-07 DIAGNOSIS — G25.81 RESTLESS LEG SYNDROME: ICD-10-CM

## 2023-12-07 DIAGNOSIS — R25.2 MUSCLE CRAMPS AT NIGHT: ICD-10-CM

## 2023-12-07 PROCEDURE — 1160F RVW MEDS BY RX/DR IN RCRD: CPT | Performed by: INTERNAL MEDICINE

## 2023-12-07 PROCEDURE — 3074F SYST BP LT 130 MM HG: CPT | Performed by: INTERNAL MEDICINE

## 2023-12-07 PROCEDURE — 3078F DIAST BP <80 MM HG: CPT | Performed by: INTERNAL MEDICINE

## 2023-12-07 PROCEDURE — 1159F MED LIST DOCD IN RCRD: CPT | Performed by: INTERNAL MEDICINE

## 2023-12-07 PROCEDURE — 99214 OFFICE O/P EST MOD 30 MIN: CPT | Performed by: INTERNAL MEDICINE

## 2023-12-07 RX ORDER — MAGNESIUM OXIDE 400 MG/1
400 TABLET ORAL DAILY
COMMUNITY

## 2023-12-07 RX ORDER — GABAPENTIN 800 MG/1
800 TABLET ORAL 3 TIMES DAILY
Qty: 90 TABLET | Refills: 5 | Status: SHIPPED | OUTPATIENT
Start: 2023-12-07

## 2023-12-07 NOTE — PROGRESS NOTES
Follow Up Office Visit      Date: 2023   Patient Name: Kymberly Real  : 1950   MRN: [id number]     Chief Complaint:    Chief Complaint   Patient presents with    Follow-up       History of Present Illness: Kymberly Real is a 73 y.o. female who is here today for 1 month follow-up average having a change in her prescription of gabapentin 300 mg from 3 tablets nightly overdue 600 mg 3 times daily and attempt to improve her fibromyalgia pain.  For unknown reason her pharmacy refilled her standard 300 mg capsules, but she has been taking 2 capsules twice daily and continued 3 capsules at nighttime, noting she has had significant though still suboptimal improvement in her fibromyalgia discomfort more so in her back and extremities.  No side effects of the gabapentin.  She does also take doxepin 50 mg nightly to benefit her discomfort.  She has complained recently of muscle cramps and spasms primarily in her left calf, this despite the gabapentin prescribed.  She has restless leg syndrome taking Requip 1 mg nightly in addition to the gabapentin as noted.  She has chronic low back pain with intermittent bilateral sciatica, having been evaluated by pain management with treatment including epidural x 2 and multiple injections in her spine include as well as a rhizotomy, unfortunately not having improved her pain, having been prescribed Norco 5 mg twice daily which she has not taking given concern about potential addiction and dependence.  She is not a candidate for NSAIDs given her decreased GFR.  She is a cigarette smoker continuing at 1 pack/day, current with her low-dose screening chest CT from last month.  Also current for mammogram last month..    Subjective      Review of Systems:   Review of Systems    I have reviewed the patients family history, social history, past medical history, past surgical history and have updated it as appropriate.     Medications:     Current Outpatient Medications:      "albuterol sulfate  (90 Base) MCG/ACT inhaler, Inhale 2 puffs Every 4 (Four) Hours As Needed for Wheezing., Disp: 18 g, Rfl: 3    Calcium Carbonate-Vitamin D 600-10 MG-MCG per tablet, Take 1 tablet by mouth 2 (Two) Times a Day., Disp: 180 tablet, Rfl: 2    doxepin (SINEquan) 50 MG capsule, Take 2 capsules by mouth every night at bedtime., Disp: 180 capsule, Rfl: 2    gabapentin (NEURONTIN) 800 MG tablet, Take 1 tablet by mouth 3 (Three) Times a Day., Disp: 90 tablet, Rfl: 5    HYDROcodone-acetaminophen (NORCO) 5-325 MG per tablet, Take 1 tablet by mouth Every 8 (Eight) Hours As Needed for Moderate Pain or Severe Pain. for 30 days., Disp: , Rfl:     hydrOXYzine (ATARAX) 25 MG tablet, Take 1 tablet by mouth Every 6 (Six) Hours As Needed., Disp: , Rfl:     lisinopril (PRINIVIL,ZESTRIL) 10 MG tablet, Take 1 tablet by mouth Daily., Disp: 90 tablet, Rfl: 2    magnesium oxide (MAG-OX) 400 MG tablet, Take 1 tablet by mouth Daily., Disp: , Rfl:     Omega-3 Fatty Acids (fish oil) 1000 MG capsule capsule, Take 1 capsule by mouth Daily With Breakfast., Disp: 90 capsule, Rfl: 2    polyethylene glycol (MiraLax) 17 GM/SCOOP powder, 1/2-1 capful daily mixed with glass of juice or water, titrating to maintain 1-2 soft bowel movements daily., Disp: 527 g, Rfl: 5    pravastatin (PRAVACHOL) 40 MG tablet, Take 1 tablet by mouth every night at bedtime., Disp: 90 tablet, Rfl: 2    rOPINIRole (REQUIP) 1 MG tablet, Take 1 tablet by mouth every night at bedtime. Take 1 hour before bedtime., Disp: 90 tablet, Rfl: 2    Allergies:   Allergies   Allergen Reactions    Penicillins Hives    Cefdinir Rash       Objective     Physical Exam: Please see above  Vital Signs:   Vitals:    12/07/23 1348   BP: 108/69   BP Location: Left arm   Patient Position: Sitting   Cuff Size: Adult   Pulse: 86   Temp: 98.6 °F (37 °C)   TempSrc: Temporal   SpO2: 100%   Weight: 67 kg (147 lb 12.8 oz)   Height: 152.4 cm (60\")     Body mass index is 28.87 kg/m².      "     Physical Exam  General: Pleasant generally healthy-appearing 73-year-old female, BMI 28.8.  Affect appropriate, no acute distress.  Lungs clear  Cardiac regular rate rhythm with no murmurs gallops or rubs  Musculoskeletal exam reveals generalized tenderness to palpation of the paraspinal musculature of her cervical through lumbar spine, lesser discomfort to palpation in her proximal arms and proximal legs with no calf tenderness, neurological exam in her upper and lower extremities as well as generally is intact.    Procedures    Results:   Labs:   Hemoglobin A1C   Date Value Ref Range Status   11/07/2023 5.6 4.8 - 5.6 % Final     Comment:              Prediabetes: 5.7 - 6.4           Diabetes: >6.4           Glycemic control for adults with diabetes: <7.0       TSH   Date Value Ref Range Status   11/07/2023 1.240 0.450 - 4.500 uIU/mL Final        POCT Results (if applicable):   Results for orders placed or performed in visit on 11/07/23   Urine culture, Comprehensive - ,    Urine  Release to smiley   Result Value Ref Range    Urine Culture Final report (A)     Result 1 Escherichia coli (A)     Susceptibility Testing Comment    Gabapentin, Urine -    Specimen: Urine    Urine  Release to smiley   Result Value Ref Range    Gabapentin, Ur 105.6 ug/mL   Urinalysis With Culture If Indicated -    Specimen: Urine    Urine  Release to smiley   Result Value Ref Range    Specific Gravity, UA 1.010 1.005 - 1.030    pH, UA 6.0 5.0 - 7.5    Color, UA Yellow Yellow    Appearance, UA Clear Clear    Leukocytes, UA 1+ (A) Negative    Protein Negative Negative/Trace    Glucose, UA Negative Negative    Ketones Negative Negative    Blood, UA Negative Negative    Bilirubin, UA Negative Negative    Urobilinogen, UA 0.2 0.2 - 1.0 mg/dL    Nitrite, UA Negative Negative    Microscopic Examination See below:     Urinalysis Reflex Comment    Vitamin D,25-Hydroxy    Specimen: Arm, Right; Blood    Blood  Release to smiley   Result Value Ref Range     25 Hydroxy, Vitamin D 39.2 30.0 - 100.0 ng/mL   Hemoglobin A1c    Specimen: Arm, Right; Blood    Blood  Release to smiley   Result Value Ref Range    Hemoglobin A1C 5.6 4.8 - 5.6 %   Lipid Panel    Specimen: Arm, Right; Blood    Blood  Release to smiley   Result Value Ref Range    Total Cholesterol 180 100 - 199 mg/dL    Triglycerides 329 (H) 0 - 149 mg/dL    HDL Cholesterol 48 >39 mg/dL    VLDL Cholesterol Ranulfo 53 (H) 5 - 40 mg/dL    LDL Chol Calc (NIH) 79 0 - 99 mg/dL   Comprehensive Metabolic Panel    Specimen: Arm, Right; Blood    Blood  Release to smiley   Result Value Ref Range    Glucose 87 70 - 99 mg/dL    BUN 30 (H) 8 - 27 mg/dL    Creatinine 1.19 (H) 0.57 - 1.00 mg/dL    EGFR Result 49 (L) >59 mL/min/1.73    BUN/Creatinine Ratio 25 12 - 28    Sodium 140 134 - 144 mmol/L    Potassium 3.9 3.5 - 5.2 mmol/L    Chloride 99 96 - 106 mmol/L    Total CO2 23 20 - 29 mmol/L    Calcium 10.0 8.7 - 10.3 mg/dL    Total Protein 7.0 6.0 - 8.5 g/dL    Albumin 4.6 3.8 - 4.8 g/dL    Globulin 2.4 1.5 - 4.5 g/dL    A/G Ratio 1.9 1.2 - 2.2    Total Bilirubin 0.3 0.0 - 1.2 mg/dL    Alkaline Phosphatase 64 44 - 121 IU/L    AST (SGOT) 17 0 - 40 IU/L    ALT (SGPT) 18 0 - 32 IU/L   TSH Rfx On Abnormal To Free T4    Specimen: Arm, Right; Blood    Blood  Release to smiley   Result Value Ref Range    TSH 1.240 0.450 - 4.500 uIU/mL   Microscopic Examination -    Urine  Release to smiley   Result Value Ref Range    WBC, UA 11-30 (A) 0 - 5 /hpf    RBC, UA None seen 0 - 2 /hpf    Epithelial Cells (non renal) 0-10 0 - 10 /hpf    Casts None seen None seen /lpf    Bacteria, UA Few None seen/Few       Imaging:   No valid procedures specified.       Smoking Cessation:   3-10 mintues spent counseling Will try to cut down    Assessment / Plan      Assessment/Plan:   Diagnoses and all orders for this visit:    1. Fibromyalgia (Primary)  -     gabapentin (NEURONTIN) 800 MG tablet; Take 1 tablet by mouth 3 (Three) Times a Day.  Dispense: 90 tablet; Refill:  5  Patient unfortunately did not receive her gabapentin 600 mg 3 times daily as prescribed last month, rather having been given gabapentin 300 mg by her pharmacy, but she has been taking 2 tablets of 600 mg twice during the daytime and continued 3 tablets or 900 mg at bedtime, noting improvement but still suboptimal control and her generalized muscular pain.  Will plan titration of her gabapentin further up to 800 mg 3 times daily, watching for side effects, reassessing clinically in 1 month.  Continue doxepin 50 mg nightly.  Advise if problems in the interim.  2. Chronic bilateral low back pain with bilateral sciatica  Chronic pain, fortunately not significant improved with use of her gabapentin and doxepin 50 mg nightly, having failed to have clinical improvement status post rhizotomy, epidural x 2 as well as multiple injections in her spine by pain management.  She had been given trial of Norco 5 mg twice daily but has not taken the prescription given concern about potential side effects and dependence.  Not a candidate for NSAIDs given her renal insufficiency.  Will assess whether she may have a response to the higher dose of gabapentin as noted above.  3. Muscle cramps at night  Nonspecific, advised hydration, stretching, and initiate magnesium oxide OTC.  4. Restless leg syndrome  -     gabapentin (NEURONTIN) 800 MG tablet; Take 1 tablet by mouth 3 (Three) Times a Day.  Dispense: 90 tablet; Refill: 5  Generally doing well on her gabapentin, titrating increased dose as noted above, as well as her Requip 1 mg nightly.  5. Cigarette smoker  Continues to smoke 1 pack of cigarettes daily, though will attempt to discontinue, noting she in the past had stopped smoking for several years, reinitiating when her  passed away.  She is up-to-date with low-dose screening chest CT last month, with early changes suggestive of emphysema.  She is motivated to stop smoking as noted.    Patient advised she would benefit  from latest RSV vaccine and Shingrix, but she indicates her Medicare plan does not cover vaccination and she cannot financially afford the vaccines.    Follow Up:   Return in about 2 months (around 2/7/2024) for Recheck.      At [facility], we believe that sharing information builds trust and better relationships. You are receiving this note because you recently visited [facility]. It is possible you will see health information before a provider has talked with you about it. This kind of information can be easy to misunderstand. To help you fully understand what it means for your health, we urge you to discuss this note with your provider.    Kai Coats MD  [facility]

## 2024-03-04 RX ORDER — DOXEPIN HYDROCHLORIDE 50 MG/1
CAPSULE ORAL
Qty: 180 CAPSULE | Refills: 0 | Status: SHIPPED | OUTPATIENT
Start: 2024-03-04

## 2024-03-11 RX ORDER — LISINOPRIL 10 MG/1
10 TABLET ORAL DAILY
Qty: 90 TABLET | Refills: 2 | Status: SHIPPED | OUTPATIENT
Start: 2024-03-11

## 2024-03-11 RX ORDER — ROPINIROLE 1 MG/1
TABLET, FILM COATED ORAL
Qty: 90 TABLET | Refills: 2 | Status: SHIPPED | OUTPATIENT
Start: 2024-03-11

## 2024-03-11 RX ORDER — LISINOPRIL 10 MG/1
10 TABLET ORAL DAILY
Qty: 90 TABLET | Refills: 2 | OUTPATIENT
Start: 2024-03-11

## 2024-03-11 RX ORDER — ROPINIROLE 1 MG/1
1 TABLET, FILM COATED ORAL
Qty: 90 TABLET | Refills: 2 | OUTPATIENT
Start: 2024-03-11

## 2024-03-12 ENCOUNTER — OFFICE VISIT (OUTPATIENT)
Dept: FAMILY MEDICINE CLINIC | Facility: CLINIC | Age: 74
End: 2024-03-12
Payer: MEDICARE

## 2024-03-12 VITALS
HEIGHT: 60 IN | WEIGHT: 146.5 LBS | HEART RATE: 67 BPM | DIASTOLIC BLOOD PRESSURE: 70 MMHG | SYSTOLIC BLOOD PRESSURE: 104 MMHG | BODY MASS INDEX: 28.76 KG/M2 | OXYGEN SATURATION: 97 % | TEMPERATURE: 97.3 F

## 2024-03-12 DIAGNOSIS — I10 ESSENTIAL (PRIMARY) HYPERTENSION: ICD-10-CM

## 2024-03-12 DIAGNOSIS — M54.41 CHRONIC BILATERAL LOW BACK PAIN WITH BILATERAL SCIATICA: Primary | ICD-10-CM

## 2024-03-12 DIAGNOSIS — R25.2 MUSCLE CRAMPS AT NIGHT: ICD-10-CM

## 2024-03-12 DIAGNOSIS — F17.210 CIGARETTE SMOKER: ICD-10-CM

## 2024-03-12 DIAGNOSIS — G89.29 CHRONIC BILATERAL LOW BACK PAIN WITH BILATERAL SCIATICA: Primary | ICD-10-CM

## 2024-03-12 DIAGNOSIS — M54.42 CHRONIC BILATERAL LOW BACK PAIN WITH BILATERAL SCIATICA: Primary | ICD-10-CM

## 2024-03-12 DIAGNOSIS — N18.31 STAGE 3A CHRONIC KIDNEY DISEASE: ICD-10-CM

## 2024-03-12 DIAGNOSIS — G25.81 RESTLESS LEG SYNDROME: ICD-10-CM

## 2024-03-12 DIAGNOSIS — M79.7 FIBROMYALGIA: ICD-10-CM

## 2024-03-12 RX ORDER — DULOXETIN HYDROCHLORIDE 30 MG/1
30 CAPSULE, DELAYED RELEASE ORAL DAILY
Qty: 30 CAPSULE | Refills: 1 | Status: SHIPPED | OUTPATIENT
Start: 2024-03-12

## 2024-03-12 NOTE — ASSESSMENT & PLAN NOTE
Doing well on combination of ropinirole 1 mg nightly, gabapentin 800 mg 3 times daily, and magnesium 400 mg daily OTC.

## 2024-03-12 NOTE — PROGRESS NOTES
Follow Up Office Visit      Date: 2024   Patient Name: Kymberly Real  : 1950   MRN: [id number]     Chief Complaint:    Chief Complaint   Patient presents with    Follow-up       History of Present Illness: Kymberly Real is a 73 y.o. female who is here today for follow-up of her chronic lumbago with neurogenic claudication into her calves bilaterally, last visit 1 month ago having an increase in her gabapentin to 800 mg 3 times daily, continuing doxepin 2 mg at 2 tablets nightly, the latter also prescribed for migraine prophylaxis.  She does note about a 50% improvement in her pain but still has significant discomfort especially with standing or walking, getting some relief when sitting down.  She does take Advil 400 mg occasionally for more severe pain, though she does question whether this is safe, noting she does have chronic kidney disease.  Does have a history of L5-S1 lumbar microdiscectomy by Dr. Keagan Stover in 2021, unfortunately not having relief in her pain, subsequent referral to orthopedic surgery in pain management where she had epidural injections without benefit, and was reportedly offered opiates which she declined..  Prior to her discectomy she did have an MRI of her L-spine in 3/2021 which in addition to her L5-S1 disc protrusion, she had multilevel spondylosis with neuroforaminal narrowing and moderate central spinal stenosis at L4-L5.  She has seen Dr. Stover back in consultation who did not feel she was a candidate for repeat surgery.  Wonders if there is anything else she can take other than the gabapentin and doxepin, noting NSAIDs not indicated given her CKD.  Also has RLS, doing well with the combination of gabapentin and magnesium.  Continues to smoke 5 or 6 cigarettes daily, low-dose screening chest CT up-to-date from 2023.    Subjective      Review of Systems:   Review of Systems    I have reviewed the patients family history, social history, past medical history,  past surgical history and have updated it as appropriate.     Medications:     Current Outpatient Medications:     albuterol sulfate  (90 Base) MCG/ACT inhaler, Inhale 2 puffs Every 4 (Four) Hours As Needed for Wheezing., Disp: 18 g, Rfl: 3    Calcium Carbonate-Vitamin D 600-10 MG-MCG per tablet, Take 1 tablet by mouth 2 (Two) Times a Day., Disp: 180 tablet, Rfl: 2    doxepin (SINEquan) 50 MG capsule, TAKE 2 CAPSULES BY MOUTH ONCE DAILY AT NIGHT AT BEDTIME, Disp: 180 capsule, Rfl: 0    gabapentin (NEURONTIN) 800 MG tablet, Take 1 tablet by mouth 3 (Three) Times a Day., Disp: 90 tablet, Rfl: 5    HYDROcodone-acetaminophen (NORCO) 5-325 MG per tablet, Take 1 tablet by mouth Every 8 (Eight) Hours As Needed for Moderate Pain or Severe Pain. for 30 days., Disp: , Rfl:     hydrOXYzine (ATARAX) 25 MG tablet, Take 1 tablet by mouth Every 6 (Six) Hours As Needed., Disp: , Rfl:     lisinopril (PRINIVIL,ZESTRIL) 10 MG tablet, Take 1 tablet by mouth once daily, Disp: 90 tablet, Rfl: 2    magnesium oxide (MAG-OX) 400 MG tablet, Take 1 tablet by mouth Daily., Disp: , Rfl:     Omega-3 Fatty Acids (fish oil) 1000 MG capsule capsule, Take 1 capsule by mouth Daily With Breakfast., Disp: 90 capsule, Rfl: 2    polyethylene glycol (MiraLax) 17 GM/SCOOP powder, 1/2-1 capful daily mixed with glass of juice or water, titrating to maintain 1-2 soft bowel movements daily., Disp: 527 g, Rfl: 5    pravastatin (PRAVACHOL) 40 MG tablet, Take 1 tablet by mouth every night at bedtime., Disp: 90 tablet, Rfl: 2    rOPINIRole (REQUIP) 1 MG tablet, TAKE 1 TABLET BY MOUTH AT NIGHT ONE HOUR PRIOR TO  BEDTIME, Disp: 90 tablet, Rfl: 2    DULoxetine (Cymbalta) 30 MG capsule, Take 1 capsule by mouth Daily., Disp: 30 capsule, Rfl: 1    Allergies:   Allergies   Allergen Reactions    Penicillins Hives    Cefdinir Rash       Objective     Physical Exam: Please see above  Vital Signs:   Vitals:    03/12/24 1344   BP: 104/70   BP Location: Right arm  "  Patient Position: Sitting   Cuff Size: Adult   Pulse: 67   Temp: 97.3 °F (36.3 °C)   TempSrc: Temporal   SpO2: 97%   Weight: 66.5 kg (146 lb 8 oz)   Height: 152.4 cm (60\")     Body mass index is 28.61 kg/m².          Physical Exam  Constitutional:       Appearance: Normal appearance.      Comments: Pleasant, healthy, smaller statured with BMI of 28.6, no acute distress though does describe some chronic lumbar discomfort especially with standing or ambulation but not so much with seated position.   Cardiovascular:      Rate and Rhythm: Normal rate and regular rhythm.      Pulses: Normal pulses.      Heart sounds: Normal heart sounds. No murmur heard.     No friction rub. No gallop.   Pulmonary:      Effort: Pulmonary effort is normal. No respiratory distress.      Breath sounds: Normal breath sounds. Rhonchi present. No wheezing or rales.   Musculoskeletal:         General: Tenderness present. No swelling or deformity.      Cervical back: No rigidity or tenderness.      Comments: Lumbar region tender to palpation diffusely at the belt line especially in the SI region bilaterally, with negative straight leg raise bilaterally in the seated position   Lymphadenopathy:      Cervical: No cervical adenopathy.   Neurological:      Mental Status: She is alert.      Sensory: No sensory deficit.      Motor: No weakness.      Coordination: Coordination normal.      Gait: Gait normal.      Deep Tendon Reflexes: Reflexes abnormal.      Comments: Knee jerks and ankle jerk reflexes bilaterally.   Psychiatric:         Mood and Affect: Mood normal.         Procedures    Results:   Labs:   Hemoglobin A1C   Date Value Ref Range Status   11/07/2023 5.6 4.8 - 5.6 % Final     Comment:              Prediabetes: 5.7 - 6.4           Diabetes: >6.4           Glycemic control for adults with diabetes: <7.0       TSH   Date Value Ref Range Status   11/07/2023 1.240 0.450 - 4.500 uIU/mL Final        POCT Results (if applicable):   Results for " orders placed or performed in visit on 11/07/23   Urine culture, Comprehensive - ,    Urine  Release to smiley   Result Value Ref Range    Urine Culture Final report (A)     Result 1 Escherichia coli (A)     Susceptibility Testing Comment    Gabapentin, Urine -    Specimen: Urine    Urine  Release to smiley   Result Value Ref Range    Gabapentin, Ur 105.6 ug/mL   Urinalysis With Culture If Indicated -    Specimen: Urine    Urine  Release to smiley   Result Value Ref Range    Specific Gravity, UA 1.010 1.005 - 1.030    pH, UA 6.0 5.0 - 7.5    Color, UA Yellow Yellow    Appearance, UA Clear Clear    Leukocytes, UA 1+ (A) Negative    Protein Negative Negative/Trace    Glucose, UA Negative Negative    Ketones Negative Negative    Blood, UA Negative Negative    Bilirubin, UA Negative Negative    Urobilinogen, UA 0.2 0.2 - 1.0 mg/dL    Nitrite, UA Negative Negative    Microscopic Examination See below:     Urinalysis Reflex Comment    Vitamin D,25-Hydroxy    Specimen: Arm, Right; Blood    Blood  Release to smiley   Result Value Ref Range    25 Hydroxy, Vitamin D 39.2 30.0 - 100.0 ng/mL   Hemoglobin A1c    Specimen: Arm, Right; Blood    Blood  Release to smiley   Result Value Ref Range    Hemoglobin A1C 5.6 4.8 - 5.6 %   Lipid Panel    Specimen: Arm, Right; Blood    Blood  Release to smiley   Result Value Ref Range    Total Cholesterol 180 100 - 199 mg/dL    Triglycerides 329 (H) 0 - 149 mg/dL    HDL Cholesterol 48 >39 mg/dL    VLDL Cholesterol Ranulfo 53 (H) 5 - 40 mg/dL    LDL Chol Calc (NIH) 79 0 - 99 mg/dL   Comprehensive Metabolic Panel    Specimen: Arm, Right; Blood    Blood  Release to smiley   Result Value Ref Range    Glucose 87 70 - 99 mg/dL    BUN 30 (H) 8 - 27 mg/dL    Creatinine 1.19 (H) 0.57 - 1.00 mg/dL    EGFR Result 49 (L) >59 mL/min/1.73    BUN/Creatinine Ratio 25 12 - 28    Sodium 140 134 - 144 mmol/L    Potassium 3.9 3.5 - 5.2 mmol/L    Chloride 99 96 - 106 mmol/L    Total CO2 23 20 - 29 mmol/L    Calcium 10.0 8.7 - 10.3  mg/dL    Total Protein 7.0 6.0 - 8.5 g/dL    Albumin 4.6 3.8 - 4.8 g/dL    Globulin 2.4 1.5 - 4.5 g/dL    A/G Ratio 1.9 1.2 - 2.2    Total Bilirubin 0.3 0.0 - 1.2 mg/dL    Alkaline Phosphatase 64 44 - 121 IU/L    AST (SGOT) 17 0 - 40 IU/L    ALT (SGPT) 18 0 - 32 IU/L   TSH Rfx On Abnormal To Free T4    Specimen: Arm, Right; Blood    Blood  Release to smiley   Result Value Ref Range    TSH 1.240 0.450 - 4.500 uIU/mL   Microscopic Examination -    Urine  Release to smiley   Result Value Ref Range    WBC, UA 11-30 (A) 0 - 5 /hpf    RBC, UA None seen 0 - 2 /hpf    Epithelial Cells (non renal) 0-10 0 - 10 /hpf    Casts None seen None seen /lpf    Bacteria, UA Few None seen/Few       Imaging:   No valid procedures specified.     Smoking Cessation:   3-10 mintues spent counseling Will try to cut down    Assessment / Plan      Assessment/Plan:   Diagnoses and all orders for this visit:    1. Chronic bilateral low back pain with bilateral sciatica (Primary)  Assessment & Plan:  Currently taking gabapentin 800 mg 3 times daily with some improvement, along with doxepin 50 mg at 2 tablets nightly, not candidate for NSAIDs given CKD, may add Tylenol, and will also initiate a trial of Cymbalta 30 mg daily with delayed onset in benefit anticipated and early potential onset and side effects discussed.  This is also to concomitantly treat her fibromyalgia.  Assess clinical response in 1 month and make further adjustment in dose depending on her clinical response and tolerability.    Orders:  -     DULoxetine (Cymbalta) 30 MG capsule; Take 1 capsule by mouth Daily.  Dispense: 30 capsule; Refill: 1    2. Fibromyalgia  Assessment & Plan:  Taking gabapentin 800 mg 3 times daily, doxepin 50 mg at 2 capsules daily, and will add trial of Cymbalta 30 mg 4 times daily starting dose reassessing clinical response in 1 month.  This regimen is to concomitantly treat her lumbago with bilateral neurogenic claudication.    Orders:  -     DULoxetine  (Cymbalta) 30 MG capsule; Take 1 capsule by mouth Daily.  Dispense: 30 capsule; Refill: 1    3. Essential (primary) hypertension    4. Muscle cramps at night  Assessment & Plan:  Doing well on gabapentin, and magnesium supplement.      5. Restless leg syndrome  Assessment & Plan:  Doing well on combination of ropinirole 1 mg nightly, gabapentin 800 mg 3 times daily, and magnesium 400 mg daily OTC.      6. Stage 3a chronic kidney disease  Assessment & Plan:  Disease stage IIIa, this precluding use of NSAIDs, taking lisinopril 10 mg daily.  Follow-up typically every 6 months.      7. Cigarette smoker  Assessment & Plan:  Continues to smoke 5 or 6 cigarettes daily.  Low-dose chest CT stable from 11/2023.  Advised again the need to stop smoking completely for health risk reduction.      Recommend patient obtain RSV and Shingrix vaccines through her pharmacy, citing safety and efficacy.    Follow Up:   Return in about 1 month (around 4/12/2024) for Recheck.      At [facility], we believe that sharing information builds trust and better relationships. You are receiving this note because you recently visited [facility]. It is possible you will see health information before a provider has talked with you about it. This kind of information can be easy to misunderstand. To help you fully understand what it means for your health, we urge you to discuss this note with your provider.    Kai Coats MD  [facility]

## 2024-03-12 NOTE — ASSESSMENT & PLAN NOTE
Taking gabapentin 800 mg 3 times daily, doxepin 50 mg at 2 capsules daily, and will add trial of Cymbalta 30 mg 4 times daily starting dose reassessing clinical response in 1 month.  This regimen is to concomitantly treat her lumbago with bilateral neurogenic claudication.

## 2024-03-12 NOTE — ASSESSMENT & PLAN NOTE
Disease stage IIIa, this precluding use of NSAIDs, taking lisinopril 10 mg daily.  Follow-up typically every 6 months.

## 2024-03-12 NOTE — ASSESSMENT & PLAN NOTE
Continues to smoke 5 or 6 cigarettes daily.  Low-dose chest CT stable from 11/2023.  Advised again the need to stop smoking completely for health risk reduction.

## 2024-03-12 NOTE — ASSESSMENT & PLAN NOTE
Currently taking gabapentin 800 mg 3 times daily with some improvement, along with doxepin 50 mg at 2 tablets nightly, not candidate for NSAIDs given CKD, may add Tylenol, and will also initiate a trial of Cymbalta 30 mg daily with delayed onset in benefit anticipated and early potential onset and side effects discussed.  This is also to concomitantly treat her fibromyalgia.  Assess clinical response in 1 month and make further adjustment in dose depending on her clinical response and tolerability.

## 2024-04-10 RX ORDER — PRAVASTATIN SODIUM 40 MG
40 TABLET ORAL
Qty: 90 TABLET | Refills: 1 | Status: SHIPPED | OUTPATIENT
Start: 2024-04-10

## 2024-04-11 PROBLEM — H91.90 HEARING LOSS: Status: ACTIVE | Noted: 2023-03-21

## 2024-04-12 ENCOUNTER — OFFICE VISIT (OUTPATIENT)
Dept: FAMILY MEDICINE CLINIC | Facility: CLINIC | Age: 74
End: 2024-04-12
Payer: MEDICARE

## 2024-04-12 VITALS
TEMPERATURE: 98.1 F | WEIGHT: 146.2 LBS | BODY MASS INDEX: 28.7 KG/M2 | DIASTOLIC BLOOD PRESSURE: 76 MMHG | OXYGEN SATURATION: 98 % | HEART RATE: 82 BPM | SYSTOLIC BLOOD PRESSURE: 120 MMHG | HEIGHT: 60 IN

## 2024-04-12 DIAGNOSIS — G89.29 CHRONIC BILATERAL LOW BACK PAIN WITH BILATERAL SCIATICA: Primary | ICD-10-CM

## 2024-04-12 DIAGNOSIS — L50.9 URTICARIA: ICD-10-CM

## 2024-04-12 DIAGNOSIS — M25.512 CHRONIC PAIN OF BOTH SHOULDERS: ICD-10-CM

## 2024-04-12 DIAGNOSIS — M54.41 CHRONIC BILATERAL LOW BACK PAIN WITH BILATERAL SCIATICA: Primary | ICD-10-CM

## 2024-04-12 DIAGNOSIS — F17.210 CIGARETTE SMOKER: ICD-10-CM

## 2024-04-12 DIAGNOSIS — M54.42 CHRONIC BILATERAL LOW BACK PAIN WITH BILATERAL SCIATICA: Primary | ICD-10-CM

## 2024-04-12 DIAGNOSIS — M25.511 CHRONIC PAIN OF BOTH SHOULDERS: ICD-10-CM

## 2024-04-12 DIAGNOSIS — M79.7 FIBROMYALGIA: ICD-10-CM

## 2024-04-12 DIAGNOSIS — Z86.69 HISTORY OF MIGRAINE HEADACHES: ICD-10-CM

## 2024-04-12 DIAGNOSIS — G89.29 CHRONIC PAIN OF BOTH SHOULDERS: ICD-10-CM

## 2024-04-12 RX ORDER — HYDROXYZINE HYDROCHLORIDE 25 MG/1
25 TABLET, FILM COATED ORAL 3 TIMES DAILY PRN
Qty: 30 TABLET | Refills: 1 | Status: SHIPPED | OUTPATIENT
Start: 2024-04-12

## 2024-04-12 RX ORDER — EPINEPHRINE 0.3 MG/.3ML
0.3 INJECTION SUBCUTANEOUS ONCE
Qty: 1 EACH | Refills: 0 | Status: SHIPPED | OUTPATIENT
Start: 2024-04-12 | End: 2024-04-12

## 2024-04-12 NOTE — ASSESSMENT & PLAN NOTE
Taking gabapentin 800 mg 3 times daily and doxepin 80 mg at 2 capsules nightly, recently given a trial of Cymbalta 30 mg daily which she stopped after 1 week given some significant facial and neck swelling which then spontaneously resolved.  She did note slight improvement in her pain on the short course of Cymbalta.  I did discuss with her there really are not a lot of other options or categories of medication to take which I be comfortable prescribing, and discussed that she contact her pain management physician, Dr. Parra whom she has not seen for some period of time, to discuss any other options either procedurally, or possibly medication wise including, depending on severity of her pain, opiate type protocol.  She may continue Tylenol and also topical Voltaren gel but is not using NSAIDs given her chronic kidney disease.

## 2024-04-12 NOTE — ASSESSMENT & PLAN NOTE
Patient describes a history of intermittent urticaria and also an apparent delayed hypersensitivity reaction with facial and neck occurring acutely 1 week after initiation of her Cymbalta.  Avoiding Cymbalta or similar products, and will prescribe EpiPen to use as needed for any severe allergic reaction, along with hydroxyzine 25 mg 3 times daily as needed for any development of urticaria.

## 2024-04-12 NOTE — ASSESSMENT & PLAN NOTE
Currently taking gabapentin 800 mg 3 times daily with some improvement, along with doxepin 50 mg at 2 tablets nightly, not candidate for NSAIDs given CKD, may add Tylenol, intolerant of recent trauma Cymbalta given apparent secondary facial neck swelling.  Really no longer other treatment options of which I be comfortable prescribing, and I recommend she contact Dr. Parra of pain management for further evaluation.    Please contact patient with COVID recommendations.  Must be isolated.  Also discussed with her signs to watch for, for which she should go to the ER.

## 2024-04-12 NOTE — PROGRESS NOTES
"    Follow Up Office Visit      Date: 2024   Patient Name: Kymberly Real  : 1950   MRN: [id number]     Chief Complaint:    Chief Complaint   Patient presents with    Follow-up       History of Present Illness: Kymberly Real is a 73 y.o. 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 She notes the Cymbalta 30 mg daily is taken for 7 days having some modest improvement in her pain but then she developed swelling of her lower face and neck, prompting her to discontinue the medication.  The swelling did resolve.  She was concerned about the location of swelling but never had any respiratory compromise.  This has not happened in the past.  She does relate a history of intermittent urticaria on her trunk and extremities, requesting a refill of hydroxyzine to use as needed..  Patient is status post L5-S1 lumbar microdiscectomy by Dr. Keagan Stover in 2021, unfortunately not having relief in her pain, referred subsequently to orthopedic surgery and then to pain management, having had epidural injections and ultimately what sounds like a lumbar rhizotomy which unfortunately did not help her pain.  She also had been given a brief course of hydrocodone by Dr. Kai Parra of pain management but did not continue this medication, thus being maintained on the gabapentin and doxepin stopping the Cymbalta as noted above.  She also mentions to me today having some bilateral shoulder discomfort assuming that this is \"arthritis\".  Her shoulders hurt with abduction and rotational type activities.  She does continue to smoke currently 5 to 6 cigarettes daily, reluctant to stop smoking given concern that she will gain " weight as she had in the past.  She does carry diagnosis of COPD but does not use any regular inhalers, noting only occasional shortness of breath.  Current with low-dose screening chest CT from 11/2023..     Subjective      Review of Systems:   Review of Systems    I have reviewed the patients family history, social history, past medical history, past surgical history and have updated it as appropriate.     Medications:     Current Outpatient Medications:     albuterol sulfate  (90 Base) MCG/ACT inhaler, Inhale 2 puffs Every 4 (Four) Hours As Needed for Wheezing., Disp: 18 g, Rfl: 3    Calcium Carbonate-Vitamin D 600-10 MG-MCG per tablet, Take 1 tablet by mouth 2 (Two) Times a Day., Disp: 180 tablet, Rfl: 2    doxepin (SINEquan) 50 MG capsule, TAKE 2 CAPSULES BY MOUTH ONCE DAILY AT NIGHT AT BEDTIME, Disp: 180 capsule, Rfl: 0    gabapentin (NEURONTIN) 800 MG tablet, Take 1 tablet by mouth 3 (Three) Times a Day., Disp: 90 tablet, Rfl: 5    HYDROcodone-acetaminophen (NORCO) 5-325 MG per tablet, Take 1 tablet by mouth Every 8 (Eight) Hours As Needed for Moderate Pain or Severe Pain. for 30 days., Disp: , Rfl:     lisinopril (PRINIVIL,ZESTRIL) 10 MG tablet, Take 1 tablet by mouth once daily, Disp: 90 tablet, Rfl: 2    magnesium oxide (MAG-OX) 400 MG tablet, Take 1 tablet by mouth Daily., Disp: , Rfl:     Omega-3 Fatty Acids (fish oil) 1000 MG capsule capsule, Take 1 capsule by mouth Daily With Breakfast., Disp: 90 capsule, Rfl: 2    polyethylene glycol (MiraLax) 17 GM/SCOOP powder, 1/2-1 capful daily mixed with glass of juice or water, titrating to maintain 1-2 soft bowel movements daily., Disp: 527 g, Rfl: 5    pravastatin (PRAVACHOL) 40 MG tablet, TAKE 1 TABLET BY MOUTH EVERY DAY AT BEDTIME, Disp: 90 tablet, Rfl: 1    rOPINIRole (REQUIP) 1 MG tablet, TAKE 1 TABLET BY MOUTH AT NIGHT ONE HOUR PRIOR TO  BEDTIME, Disp: 90 tablet, Rfl: 2    EPINEPHrine (EpiPen 2-Nacho) 0.3 MG/0.3ML solution auto-injector injection,  "Inject 0.3 mL into the appropriate muscle as directed by prescriber 1 (One) Time for 1 dose. As needed for severe allergic reaction, Disp: 1 each, Rfl: 0    hydrOXYzine (ATARAX) 25 MG tablet, Take 1 tablet by mouth 3 (Three) Times a Day As Needed for Itching, Allergies or Anxiety., Disp: 30 tablet, Rfl: 1    Allergies:   Allergies   Allergen Reactions    Duloxetine Swelling     Facial/neck swelling    Penicillins Hives    Cefdinir Rash       Objective     Physical Exam: Please see above  Vital Signs:   Vitals:    04/12/24 1337   BP: 120/76   BP Location: Left arm   Patient Position: Sitting   Cuff Size: Adult   Pulse: 82   Temp: 98.1 °F (36.7 °C)   TempSrc: Temporal   SpO2: 98%   Weight: 66.3 kg (146 lb 3.2 oz)   Height: 152.4 cm (60\")     Body mass index is 28.55 kg/m².          Physical Exam  Constitutional:       General: She is not in acute distress.     Appearance: Normal appearance. She is not ill-appearing.      Comments: Smaller statured, BMI 28.5 unchanged over the last month, complaining of some generalized discomfort but not appearing to be in any acute distress.   Cardiovascular:      Rate and Rhythm: Normal rate and regular rhythm.      Heart sounds: Normal heart sounds. No murmur heard.     No friction rub. No gallop.   Pulmonary:      Effort: Pulmonary effort is normal. No respiratory distress.      Breath sounds: Normal breath sounds.   Musculoskeletal:         General: Tenderness present. No swelling, deformity or signs of injury.      Cervical back: Normal range of motion. No rigidity or tenderness.      Right lower leg: No edema.      Left lower leg: No edema.      Comments: Patient has generalized tenderness palpation of her spine, to lesser degree also in the proximal or mid thigh musculature, shoulders bilaterally with minimal discomfort at palpation of her joint margins but positive Neer sign positive Colin sign both internal and external rotation of her shoulder joints   Neurological:      " General: No focal deficit present.      Mental Status: She is alert and oriented to person, place, and time. Mental status is at baseline.   Psychiatric:         Mood and Affect: Mood normal.         Behavior: Behavior normal.         Thought Content: Thought content normal.         Judgment: Judgment normal.         Procedures    Results:   Labs:   Hemoglobin A1C   Date Value Ref Range Status   11/07/2023 5.6 4.8 - 5.6 % Final     Comment:              Prediabetes: 5.7 - 6.4           Diabetes: >6.4           Glycemic control for adults with diabetes: <7.0       TSH   Date Value Ref Range Status   11/07/2023 1.240 0.450 - 4.500 uIU/mL Final        POCT Results (if applicable):   Results for orders placed or performed in visit on 11/07/23   Urine culture, Comprehensive - ,    Urine  Release to smiley   Result Value Ref Range    Urine Culture Final report (A)     Result 1 Escherichia coli (A)     Susceptibility Testing Comment    Gabapentin, Urine -    Specimen: Urine    Urine  Release to smiley   Result Value Ref Range    Gabapentin, Ur 105.6 ug/mL   Urinalysis With Culture If Indicated -    Specimen: Urine    Urine  Release to smiley   Result Value Ref Range    Specific Gravity, UA 1.010 1.005 - 1.030    pH, UA 6.0 5.0 - 7.5    Color, UA Yellow Yellow    Appearance, UA Clear Clear    Leukocytes, UA 1+ (A) Negative    Protein Negative Negative/Trace    Glucose, UA Negative Negative    Ketones Negative Negative    Blood, UA Negative Negative    Bilirubin, UA Negative Negative    Urobilinogen, UA 0.2 0.2 - 1.0 mg/dL    Nitrite, UA Negative Negative    Microscopic Examination See below:     Urinalysis Reflex Comment    Vitamin D,25-Hydroxy    Specimen: Arm, Right; Blood    Blood  Release to smiley   Result Value Ref Range    25 Hydroxy, Vitamin D 39.2 30.0 - 100.0 ng/mL   Hemoglobin A1c    Specimen: Arm, Right; Blood    Blood  Release to smiley   Result Value Ref Range    Hemoglobin A1C 5.6 4.8 - 5.6 %   Lipid Panel    Specimen:  Arm, Right; Blood    Blood  Release to smiley   Result Value Ref Range    Total Cholesterol 180 100 - 199 mg/dL    Triglycerides 329 (H) 0 - 149 mg/dL    HDL Cholesterol 48 >39 mg/dL    VLDL Cholesterol Ranulfo 53 (H) 5 - 40 mg/dL    LDL Chol Calc (NIH) 79 0 - 99 mg/dL   Comprehensive Metabolic Panel    Specimen: Arm, Right; Blood    Blood  Release to smiley   Result Value Ref Range    Glucose 87 70 - 99 mg/dL    BUN 30 (H) 8 - 27 mg/dL    Creatinine 1.19 (H) 0.57 - 1.00 mg/dL    EGFR Result 49 (L) >59 mL/min/1.73    BUN/Creatinine Ratio 25 12 - 28    Sodium 140 134 - 144 mmol/L    Potassium 3.9 3.5 - 5.2 mmol/L    Chloride 99 96 - 106 mmol/L    Total CO2 23 20 - 29 mmol/L    Calcium 10.0 8.7 - 10.3 mg/dL    Total Protein 7.0 6.0 - 8.5 g/dL    Albumin 4.6 3.8 - 4.8 g/dL    Globulin 2.4 1.5 - 4.5 g/dL    A/G Ratio 1.9 1.2 - 2.2    Total Bilirubin 0.3 0.0 - 1.2 mg/dL    Alkaline Phosphatase 64 44 - 121 IU/L    AST (SGOT) 17 0 - 40 IU/L    ALT (SGPT) 18 0 - 32 IU/L   TSH Rfx On Abnormal To Free T4    Specimen: Arm, Right; Blood    Blood  Release to smiley   Result Value Ref Range    TSH 1.240 0.450 - 4.500 uIU/mL   Microscopic Examination -    Urine  Release to smiley   Result Value Ref Range    WBC, UA 11-30 (A) 0 - 5 /hpf    RBC, UA None seen 0 - 2 /hpf    Epithelial Cells (non renal) 0-10 0 - 10 /hpf    Casts None seen None seen /lpf    Bacteria, UA Few None seen/Few       Imaging:   No valid procedures specified.       Smoking Cessation:   3-10 mintues spent counseling Will try to cut down    Assessment / Plan      Assessment/Plan:   Diagnoses and all orders for this visit:    1. Chronic bilateral low back pain with bilateral sciatica (Primary)  Assessment & Plan:  Currently taking gabapentin 800 mg 3 times daily with some improvement, along with doxepin 50 mg at 2 tablets nightly, not candidate for NSAIDs given CKD, may add Tylenol, intolerant of recent trauma Cymbalta given apparent secondary facial neck swelling.  Really no  longer other treatment options of which I be comfortable prescribing, and I recommend she contact Dr. Parra of pain management for further evaluation.       2. Fibromyalgia  Assessment & Plan:  Taking gabapentin 800 mg 3 times daily and doxepin 80 mg at 2 capsules nightly, recently given a trial of Cymbalta 30 mg daily which she stopped after 1 week given some significant facial and neck swelling which then spontaneously resolved.  She did note slight improvement in her pain on the short course of Cymbalta.  I did discuss with her there really are not a lot of other options or categories of medication to take which I be comfortable prescribing, and discussed that she contact her pain management physician, Dr. Parra whom she has not seen for some period of time, to discuss any other options either procedurally, or possibly medication wise including, depending on severity of her pain, opiate type protocol.  She may continue Tylenol and also topical Voltaren gel but is not using NSAIDs given her chronic kidney disease.      3. Chronic pain of both shoulders  Assessment & Plan:  Physical exam consistent with rotator cuff tendinopathy and presumed underlying degenerative arthritis.  Recommended ongoing use of her current regimen including doxepin, gabapentin, Tylenol, and may add topical Voltaren gel 2-4 times daily as needed.  If becomes more problematic, we can discuss consideration of intra-articular steroid injections.      4. History of migraine headaches  Assessment & Plan:  Reports good control of her migraine headaches taking doxepin 50 mg at 2 tablets nightly.      5. Urticaria  Assessment & Plan:  Patient describes a history of intermittent urticaria and also an apparent delayed hypersensitivity reaction with facial and neck occurring acutely 1 week after initiation of her Cymbalta.  Avoiding Cymbalta or similar products, and will prescribe EpiPen to use as needed for any severe allergic reaction, along  with hydroxyzine 25 mg 3 times daily as needed for any development of urticaria.    Orders:  -     hydrOXYzine (ATARAX) 25 MG tablet; Take 1 tablet by mouth 3 (Three) Times a Day As Needed for Itching, Allergies or Anxiety.  Dispense: 30 tablet; Refill: 1  -     EPINEPHrine (EpiPen 2-Nacho) 0.3 MG/0.3ML solution auto-injector injection; Inject 0.3 mL into the appropriate muscle as directed by prescriber 1 (One) Time for 1 dose. As needed for severe allergic reaction  Dispense: 1 each; Refill: 0    6. Cigarette smoker  Assessment & Plan:  Continues to smoke 5 or 6 cigarettes daily. Low-dose chest CT stable from 11/2023. Advised again the need to stop smoking completely for health risk reduction.           Follow Up:   Return if symptoms worsen or fail to improve, for Medicare Subsq..      At [facility], we believe that sharing information builds trust and better relationships. You are receiving this note because you recently visited [facility]. It is possible you will see health information before a provider has talked with you about it. This kind of information can be easy to misunderstand. To help you fully understand what it means for your health, we urge you to discuss this note with your provider.    Kai Coats MD  [facility]

## 2024-04-12 NOTE — ASSESSMENT & PLAN NOTE
Physical exam consistent with rotator cuff tendinopathy and presumed underlying degenerative arthritis.  Recommended ongoing use of her current regimen including doxepin, gabapentin, Tylenol, and may add topical Voltaren gel 2-4 times daily as needed.  If becomes more problematic, we can discuss consideration of intra-articular steroid injections.

## 2024-06-13 ENCOUNTER — OFFICE VISIT (OUTPATIENT)
Dept: FAMILY MEDICINE CLINIC | Facility: CLINIC | Age: 74
End: 2024-06-13
Payer: MEDICARE

## 2024-06-13 VITALS
HEIGHT: 60 IN | SYSTOLIC BLOOD PRESSURE: 124 MMHG | BODY MASS INDEX: 29.06 KG/M2 | OXYGEN SATURATION: 97 % | TEMPERATURE: 97.8 F | HEART RATE: 83 BPM | WEIGHT: 148 LBS | DIASTOLIC BLOOD PRESSURE: 78 MMHG

## 2024-06-13 DIAGNOSIS — G89.29 CHRONIC RIGHT SHOULDER PAIN: Primary | ICD-10-CM

## 2024-06-13 DIAGNOSIS — M25.511 CHRONIC RIGHT SHOULDER PAIN: ICD-10-CM

## 2024-06-13 DIAGNOSIS — M54.41 CHRONIC BILATERAL LOW BACK PAIN WITH BILATERAL SCIATICA: ICD-10-CM

## 2024-06-13 DIAGNOSIS — F17.210 CIGARETTE SMOKER: ICD-10-CM

## 2024-06-13 DIAGNOSIS — M25.511 CHRONIC RIGHT SHOULDER PAIN: Primary | ICD-10-CM

## 2024-06-13 DIAGNOSIS — M54.42 CHRONIC BILATERAL LOW BACK PAIN WITH BILATERAL SCIATICA: ICD-10-CM

## 2024-06-13 DIAGNOSIS — M79.7 FIBROMYALGIA: ICD-10-CM

## 2024-06-13 DIAGNOSIS — G89.29 CHRONIC RIGHT SHOULDER PAIN: ICD-10-CM

## 2024-06-13 DIAGNOSIS — G25.81 RESTLESS LEG SYNDROME: ICD-10-CM

## 2024-06-13 DIAGNOSIS — G89.29 CHRONIC BILATERAL LOW BACK PAIN WITH BILATERAL SCIATICA: ICD-10-CM

## 2024-06-13 PROCEDURE — 1160F RVW MEDS BY RX/DR IN RCRD: CPT | Performed by: INTERNAL MEDICINE

## 2024-06-13 PROCEDURE — G2211 COMPLEX E/M VISIT ADD ON: HCPCS | Performed by: INTERNAL MEDICINE

## 2024-06-13 PROCEDURE — 99214 OFFICE O/P EST MOD 30 MIN: CPT | Performed by: INTERNAL MEDICINE

## 2024-06-13 PROCEDURE — 1159F MED LIST DOCD IN RCRD: CPT | Performed by: INTERNAL MEDICINE

## 2024-06-13 PROCEDURE — 3074F SYST BP LT 130 MM HG: CPT | Performed by: INTERNAL MEDICINE

## 2024-06-13 PROCEDURE — 3078F DIAST BP <80 MM HG: CPT | Performed by: INTERNAL MEDICINE

## 2024-06-13 RX ORDER — DOXEPIN HYDROCHLORIDE 100 MG/1
100 CAPSULE ORAL NIGHTLY
Qty: 90 CAPSULE | Refills: 3 | Status: SHIPPED | OUTPATIENT
Start: 2024-06-13

## 2024-06-13 RX ORDER — PREDNISONE 20 MG/1
20 TABLET ORAL 2 TIMES DAILY
Qty: 10 TABLET | Refills: 0 | Status: SHIPPED | OUTPATIENT
Start: 2024-06-13 | End: 2024-06-18

## 2024-06-13 RX ORDER — GABAPENTIN 800 MG/1
800 TABLET ORAL 3 TIMES DAILY
Qty: 90 TABLET | Refills: 5 | Status: SHIPPED | OUTPATIENT
Start: 2024-06-13

## 2024-06-13 NOTE — ASSESSMENT & PLAN NOTE
Overall satisfactory control taking gabapentin 800 mg 3 times daily and doxepin 50 mg at 2 tablets or 100 mg nightly, not candidate for NSAIDs given CKD, using Tylenol for supplemental relief, intolerant of trial of Cymbalta given secondary facial neck swelling.  Continue current regimen other than prescription for simplicity switching her doxepin regimen over to a single 100 mg capsule nightly.

## 2024-06-13 NOTE — ASSESSMENT & PLAN NOTE
Controlled with ropinirole 1 mg nightly, gabapentin 800 mg 3 times daily, and magnesium 400 mg daily OTC.

## 2024-06-13 NOTE — ASSESSMENT & PLAN NOTE
Continue smoking 5 to 6 cigarettes daily recently, previously having been a heavier smoker with an intervening period of abstinence.  Low-dose screening chest CT from 11/2023 stable with a 1 year follow-up recommended.  Strongly advised patient to stop smoking for health risk reduction.

## 2024-06-13 NOTE — ASSESSMENT & PLAN NOTE
Acute on chronic right shoulder pain with minimal left shoulder pain, examination consistent with rotator cuff tendinopathy.  Obtain x-ray of the right shoulder, initiate prednisone as prescribed, continue Tylenol noting not a candidate for NSAIDs given chronic kidney disease.  If patient does not have a clinical result regarding pain control with the prednisone, or if has significant recurrence within a short period of time, she is to advise me accordingly for consideration of intra-articular steroid injection to the right shoulder, assuming her x-ray is nonconcerning.  Advise accordingly.

## 2024-06-13 NOTE — PROGRESS NOTES
Follow Up Office Visit      Date: 2024   Patient Name: Kymberly Real  : 1950   MRN: [id number]     Chief Complaint:    Chief Complaint   Patient presents with    Shoulder Pain       History of Present Illness: Kymberly Real is a 74 y.o. female who is here today for concern of increasing right shoulder pain, present for several months now, but now constantly noted, ranging anywhere from 8 up to a 10 on a scale of 10, pain noted at rest again but more so with movement such as rotation, reaching upwards, etc.  She has minimal discomfort to left shoulder which is not terribly bothersome to her.  No history of trauma, no radicular symptoms into her distal lower extremity.  Taking Tylenol, noncandidate for NSAIDs given chronic kidney disease.  Also has chronic low back pain myalgia for which she takes gabapentin 800 mg 3 times daily and doxepin 50 mg taking 2 capsules nightly with satisfactory though not complete relief of her pain..  Has restless leg syndrome for which she takes again the gabapentin as well as ropinirole 1 mg nightly with satisfactory control of symptoms.  Continues to smoke 5 to 6 cigarettes daily.    Subjective      Review of Systems:   Review of Systems    I have reviewed the patients family history, social history, past medical history, past surgical history and have updated it as appropriate.     Medications:     Current Outpatient Medications:     albuterol sulfate  (90 Base) MCG/ACT inhaler, Inhale 2 puffs Every 4 (Four) Hours As Needed for Wheezing., Disp: 18 g, Rfl: 3    Calcium Carbonate-Vitamin D 600-10 MG-MCG per tablet, Take 1 tablet by mouth 2 (Two) Times a Day., Disp: 180 tablet, Rfl: 2    gabapentin (NEURONTIN) 800 MG tablet, Take 1 tablet by mouth 3 (Three) Times a Day., Disp: 90 tablet, Rfl: 5    HYDROcodone-acetaminophen (NORCO) 5-325 MG per tablet, Take 1 tablet by mouth Every 8 (Eight) Hours As Needed for Moderate Pain or Severe Pain. for 30 days., Disp:  ", Rfl:     hydrOXYzine (ATARAX) 25 MG tablet, Take 1 tablet by mouth 3 (Three) Times a Day As Needed for Itching, Allergies or Anxiety., Disp: 30 tablet, Rfl: 1    lisinopril (PRINIVIL,ZESTRIL) 10 MG tablet, Take 1 tablet by mouth once daily, Disp: 90 tablet, Rfl: 2    magnesium oxide (MAG-OX) 400 MG tablet, Take 1 tablet by mouth Daily., Disp: , Rfl:     Omega-3 Fatty Acids (fish oil) 1000 MG capsule capsule, Take 1 capsule by mouth Daily With Breakfast., Disp: 90 capsule, Rfl: 2    polyethylene glycol (MiraLax) 17 GM/SCOOP powder, 1/2-1 capful daily mixed with glass of juice or water, titrating to maintain 1-2 soft bowel movements daily., Disp: 527 g, Rfl: 5    pravastatin (PRAVACHOL) 40 MG tablet, TAKE 1 TABLET BY MOUTH EVERY DAY AT BEDTIME, Disp: 90 tablet, Rfl: 1    rOPINIRole (REQUIP) 1 MG tablet, TAKE 1 TABLET BY MOUTH AT NIGHT ONE HOUR PRIOR TO  BEDTIME, Disp: 90 tablet, Rfl: 2    doxepin (SINEquan) 100 MG capsule, Take 1 capsule by mouth Every Night., Disp: 90 capsule, Rfl: 3    predniSONE (DELTASONE) 20 MG tablet, Take 1 tablet by mouth 2 (Two) Times a Day for 5 days., Disp: 10 tablet, Rfl: 0    Allergies:   Allergies   Allergen Reactions    Duloxetine Swelling     Facial/neck swelling    Penicillins Hives    Cefdinir Rash       Objective     Physical Exam: Please see above  Vital Signs:   Vitals:    06/13/24 1340   BP: 124/78   BP Location: Left arm   Patient Position: Sitting   Cuff Size: Adult   Pulse: 83   Temp: 97.8 °F (36.6 °C)   TempSrc: Temporal   SpO2: 97%   Weight: 67.1 kg (148 lb)   Height: 152.4 cm (60\")     Body mass index is 28.9 kg/m².  BMI is >= 25 and <30. (Overweight) The following options were offered after discussion;: exercise counseling/recommendations and nutrition counseling/recommendations       Physical Exam  Constitutional:       General: She is not in acute distress.     Appearance: Normal appearance. She is not ill-appearing.      Comments: Overall a healthy appearing smaller " statured 74-year-old female, BMI of 28.9   Cardiovascular:      Rate and Rhythm: Normal rate and regular rhythm.      Heart sounds: Normal heart sounds. No murmur heard.     No friction rub. No gallop.   Pulmonary:      Effort: Pulmonary effort is normal. No respiratory distress.      Breath sounds: Normal breath sounds.   Musculoskeletal:         General: Tenderness present. No swelling, deformity or signs of injury.      Right lower leg: No edema.      Left lower leg: No edema.      Comments: Right shoulder tenderness palpation along the joint margin diffusely, positive Neer sign, positive internal greater than external Colin sign, no obvious subluxation, left shoulder with minimal tenderness palpation on the joint margin with minimal positive Neer sign, no significant positive Colin sign at this time, again no subluxation, lower back with some chronic tenderness palpation across the lumbar region diffusely at baseline   Neurological:      Mental Status: She is alert.         Procedures    Results:   Labs:   Hemoglobin A1C   Date Value Ref Range Status   11/07/2023 5.6 4.8 - 5.6 % Final     Comment:              Prediabetes: 5.7 - 6.4           Diabetes: >6.4           Glycemic control for adults with diabetes: <7.0       TSH   Date Value Ref Range Status   11/07/2023 1.240 0.450 - 4.500 uIU/mL Final        POCT Results (if applicable):   Results for orders placed or performed in visit on 11/07/23   Urine culture, Comprehensive - ,    Urine  Release to smiley   Result Value Ref Range    Urine Culture Final report (A)     Result 1 Escherichia coli (A)     Susceptibility Testing Comment    Gabapentin, Urine -    Specimen: Urine    Urine  Release to smiley   Result Value Ref Range    Gabapentin, Ur 105.6 ug/mL   Urinalysis With Culture If Indicated -    Specimen: Urine    Urine  Release to smiley   Result Value Ref Range    Specific Gravity, UA 1.010 1.005 - 1.030    pH, UA 6.0 5.0 - 7.5    Color, UA Yellow Yellow     Appearance, UA Clear Clear    Leukocytes, UA 1+ (A) Negative    Protein Negative Negative/Trace    Glucose, UA Negative Negative    Ketones Negative Negative    Blood, UA Negative Negative    Bilirubin, UA Negative Negative    Urobilinogen, UA 0.2 0.2 - 1.0 mg/dL    Nitrite, UA Negative Negative    Microscopic Examination See below:     Urinalysis Reflex Comment    Vitamin D,25-Hydroxy    Specimen: Arm, Right; Blood    Blood  Release to smiley   Result Value Ref Range    25 Hydroxy, Vitamin D 39.2 30.0 - 100.0 ng/mL   Hemoglobin A1c    Specimen: Arm, Right; Blood    Blood  Release to smiley   Result Value Ref Range    Hemoglobin A1C 5.6 4.8 - 5.6 %   Lipid Panel    Specimen: Arm, Right; Blood    Blood  Release to smiley   Result Value Ref Range    Total Cholesterol 180 100 - 199 mg/dL    Triglycerides 329 (H) 0 - 149 mg/dL    HDL Cholesterol 48 >39 mg/dL    VLDL Cholesterol Ranulfo 53 (H) 5 - 40 mg/dL    LDL Chol Calc (NIH) 79 0 - 99 mg/dL   Comprehensive Metabolic Panel    Specimen: Arm, Right; Blood    Blood  Release to smiley   Result Value Ref Range    Glucose 87 70 - 99 mg/dL    BUN 30 (H) 8 - 27 mg/dL    Creatinine 1.19 (H) 0.57 - 1.00 mg/dL    EGFR Result 49 (L) >59 mL/min/1.73    BUN/Creatinine Ratio 25 12 - 28    Sodium 140 134 - 144 mmol/L    Potassium 3.9 3.5 - 5.2 mmol/L    Chloride 99 96 - 106 mmol/L    Total CO2 23 20 - 29 mmol/L    Calcium 10.0 8.7 - 10.3 mg/dL    Total Protein 7.0 6.0 - 8.5 g/dL    Albumin 4.6 3.8 - 4.8 g/dL    Globulin 2.4 1.5 - 4.5 g/dL    A/G Ratio 1.9 1.2 - 2.2    Total Bilirubin 0.3 0.0 - 1.2 mg/dL    Alkaline Phosphatase 64 44 - 121 IU/L    AST (SGOT) 17 0 - 40 IU/L    ALT (SGPT) 18 0 - 32 IU/L   TSH Rfx On Abnormal To Free T4    Specimen: Arm, Right; Blood    Blood  Release to smiley   Result Value Ref Range    TSH 1.240 0.450 - 4.500 uIU/mL   Microscopic Examination -    Urine  Release to smiley   Result Value Ref Range    WBC, UA 11-30 (A) 0 - 5 /hpf    RBC, UA None seen 0 - 2 /hpf     Epithelial Cells (non renal) 0-10 0 - 10 /hpf    Casts None seen None seen /lpf    Bacteria, UA Few None seen/Few       Imaging:   No valid procedures specified.         Smoking Cessation:   3-10 mintues spent counseling Will try to cut down    Assessment / Plan      Assessment/Plan:   Diagnoses and all orders for this visit:    1. Chronic right shoulder pain (Primary)  Assessment & Plan:  Acute on chronic right shoulder pain with minimal left shoulder pain, examination consistent with rotator cuff tendinopathy.  Obtain x-ray of the right shoulder, initiate prednisone as prescribed, continue Tylenol noting not a candidate for NSAIDs given chronic kidney disease.  If patient does not have a clinical result regarding pain control with the prednisone, or if has significant recurrence within a short period of time, she is to advise me accordingly for consideration of intra-articular steroid injection to the right shoulder, assuming her x-ray is nonconcerning.  Advise accordingly.    Orders:  -     predniSONE (DELTASONE) 20 MG tablet; Take 1 tablet by mouth 2 (Two) Times a Day for 5 days.  Dispense: 10 tablet; Refill: 0  -     XR Shoulder 2+ View Right; Future    2. Chronic bilateral low back pain with bilateral sciatica  Assessment & Plan:  Overall satisfactory control taking gabapentin 800 mg 3 times daily and doxepin 50 mg at 2 tablets or 100 mg nightly, not candidate for NSAIDs given CKD, using Tylenol for supplemental relief, intolerant of trial of Cymbalta given secondary facial neck swelling.  Continue current regimen other than prescription for simplicity switching her doxepin regimen over to a single 100 mg capsule nightly.    Orders:  -     gabapentin (NEURONTIN) 800 MG tablet; Take 1 tablet by mouth 3 (Three) Times a Day.  Dispense: 90 tablet; Refill: 5  -     doxepin (SINEquan) 100 MG capsule; Take 1 capsule by mouth Every Night.  Dispense: 90 capsule; Refill: 3    3. Fibromyalgia  Assessment &  Plan:  Satisfactory pain control though still some symptoms taking gabapentin 800 mg 3 times daily and doxepin 50 mg at 2 capsules or 100 mg nightly, previously having been given trial of Cymbalta which patient stopped given subjective facial and neck swelling.  Overall satisfactory control on current regimen supplemented by Tylenol, not a candidate for NSAIDs given her chronic kidney disease.  Refill current medication regimen, other than switching her doxepin 50 mg over to 100 mg at 1 capsule nightly for simplicity purposes.    Orders:  -     gabapentin (NEURONTIN) 800 MG tablet; Take 1 tablet by mouth 3 (Three) Times a Day.  Dispense: 90 tablet; Refill: 5  -     doxepin (SINEquan) 100 MG capsule; Take 1 capsule by mouth Every Night.  Dispense: 90 capsule; Refill: 3    4. Restless leg syndrome  Assessment & Plan:  Controlled with ropinirole 1 mg nightly, gabapentin 800 mg 3 times daily, and magnesium 400 mg daily OTC.    Orders:  -     gabapentin (NEURONTIN) 800 MG tablet; Take 1 tablet by mouth 3 (Three) Times a Day.  Dispense: 90 tablet; Refill: 5    5. Cigarette smoker  Assessment & Plan:  Continue smoking 5 to 6 cigarettes daily recently, previously having been a heavier smoker with an intervening period of abstinence.  Low-dose screening chest CT from 11/2023 stable with a 1 year follow-up recommended.  Strongly advised patient to stop smoking for health risk reduction.        Follow Up:   Return if symptoms worsen or fail to improve.      At [facility], we believe that sharing information builds trust and better relationships. You are receiving this note because you recently visited [facility]. It is possible you will see health information before a provider has talked with you about it. This kind of information can be easy to misunderstand. To help you fully understand what it means for your health, we urge you to discuss this note with your provider.    Kai Coats MD  [facility]

## 2024-06-13 NOTE — ASSESSMENT & PLAN NOTE
Satisfactory pain control though still some symptoms taking gabapentin 800 mg 3 times daily and doxepin 50 mg at 2 capsules or 100 mg nightly, previously having been given trial of Cymbalta which patient stopped given subjective facial and neck swelling.  Overall satisfactory control on current regimen supplemented by Tylenol, not a candidate for NSAIDs given her chronic kidney disease.  Refill current medication regimen, other than switching her doxepin 50 mg over to 100 mg at 1 capsule nightly for simplicity purposes.

## 2024-06-14 ENCOUNTER — TELEPHONE (OUTPATIENT)
Dept: FAMILY MEDICINE CLINIC | Facility: CLINIC | Age: 74
End: 2024-06-14
Payer: MEDICARE

## 2024-06-14 NOTE — TELEPHONE ENCOUNTER
----- Message from Kai Coats sent at 6/13/2024  7:04 PM EDT -----  Please advise patient that her right shoulder x-ray obtained on 6/13/2024 reveals some mild arthritic changes.  Pursue plan discussed in the office today with oral steroids, advising if symptoms not improving at which point we will then have patient schedule appointment for intra-articular shoulder injection.    I have spoke with her regarding her xray results. TF

## 2024-10-04 DIAGNOSIS — L50.9 URTICARIA: ICD-10-CM

## 2024-10-04 RX ORDER — PRAVASTATIN SODIUM 40 MG
40 TABLET ORAL
Qty: 90 TABLET | Refills: 1 | Status: SHIPPED | OUTPATIENT
Start: 2024-10-04

## 2024-10-04 RX ORDER — HYDROXYZINE HYDROCHLORIDE 25 MG/1
TABLET, FILM COATED ORAL
Qty: 30 TABLET | Refills: 3 | Status: SHIPPED | OUTPATIENT
Start: 2024-10-04

## 2024-11-12 ENCOUNTER — OFFICE VISIT (OUTPATIENT)
Dept: FAMILY MEDICINE CLINIC | Facility: CLINIC | Age: 74
End: 2024-11-12
Payer: MEDICARE

## 2024-11-12 VITALS
SYSTOLIC BLOOD PRESSURE: 122 MMHG | HEIGHT: 60 IN | HEART RATE: 72 BPM | TEMPERATURE: 98.2 F | OXYGEN SATURATION: 99 % | RESPIRATION RATE: 18 BRPM | DIASTOLIC BLOOD PRESSURE: 80 MMHG | BODY MASS INDEX: 28.27 KG/M2 | WEIGHT: 144 LBS

## 2024-11-12 DIAGNOSIS — Z12.11 ENCOUNTER FOR SCREENING FOR MALIGNANT NEOPLASM OF COLON: ICD-10-CM

## 2024-11-12 DIAGNOSIS — M85.852 OSTEOPENIA OF BOTH HIPS: ICD-10-CM

## 2024-11-12 DIAGNOSIS — N18.31 STAGE 3A CHRONIC KIDNEY DISEASE: ICD-10-CM

## 2024-11-12 DIAGNOSIS — E61.2 MAGNESIUM DEFICIENCY: ICD-10-CM

## 2024-11-12 DIAGNOSIS — I10 ESSENTIAL (PRIMARY) HYPERTENSION: ICD-10-CM

## 2024-11-12 DIAGNOSIS — M54.41 CHRONIC BILATERAL LOW BACK PAIN WITH BILATERAL SCIATICA: ICD-10-CM

## 2024-11-12 DIAGNOSIS — Z13.29 SCREENING FOR THYROID DISORDER: ICD-10-CM

## 2024-11-12 DIAGNOSIS — Z12.31 ENCOUNTER FOR SCREENING MAMMOGRAM FOR MALIGNANT NEOPLASM OF BREAST: ICD-10-CM

## 2024-11-12 DIAGNOSIS — Z13.1 SCREENING FOR DIABETES MELLITUS: ICD-10-CM

## 2024-11-12 DIAGNOSIS — F17.210 CIGARETTE SMOKER: ICD-10-CM

## 2024-11-12 DIAGNOSIS — E78.5 DYSLIPIDEMIA: ICD-10-CM

## 2024-11-12 DIAGNOSIS — E55.9 VITAMIN D DEFICIENCY: ICD-10-CM

## 2024-11-12 DIAGNOSIS — Z00.01 ENCOUNTER FOR GENERAL ADULT MEDICAL EXAMINATION WITH ABNORMAL FINDINGS: ICD-10-CM

## 2024-11-12 DIAGNOSIS — M85.851 OSTEOPENIA OF BOTH HIPS: ICD-10-CM

## 2024-11-12 DIAGNOSIS — Z00.00 MEDICARE ANNUAL WELLNESS VISIT, SUBSEQUENT: Primary | ICD-10-CM

## 2024-11-12 DIAGNOSIS — M54.42 CHRONIC BILATERAL LOW BACK PAIN WITH BILATERAL SCIATICA: ICD-10-CM

## 2024-11-12 DIAGNOSIS — M79.7 FIBROMYALGIA: ICD-10-CM

## 2024-11-12 DIAGNOSIS — Z12.2 SCREENING FOR LUNG CANCER: ICD-10-CM

## 2024-11-12 DIAGNOSIS — G89.29 CHRONIC BILATERAL LOW BACK PAIN WITH BILATERAL SCIATICA: ICD-10-CM

## 2024-11-12 DIAGNOSIS — E66.3 OVERWEIGHT (BMI 25.0-29.9): ICD-10-CM

## 2024-11-12 DIAGNOSIS — K59.00 CONSTIPATION, UNSPECIFIED CONSTIPATION TYPE: ICD-10-CM

## 2024-11-12 DIAGNOSIS — I49.3 PVC'S (PREMATURE VENTRICULAR CONTRACTIONS): ICD-10-CM

## 2024-11-12 PROCEDURE — 93000 ELECTROCARDIOGRAM COMPLETE: CPT | Performed by: INTERNAL MEDICINE

## 2024-11-12 PROCEDURE — 1159F MED LIST DOCD IN RCRD: CPT | Performed by: INTERNAL MEDICINE

## 2024-11-12 PROCEDURE — 3079F DIAST BP 80-89 MM HG: CPT | Performed by: INTERNAL MEDICINE

## 2024-11-12 PROCEDURE — 3074F SYST BP LT 130 MM HG: CPT | Performed by: INTERNAL MEDICINE

## 2024-11-12 PROCEDURE — 1170F FXNL STATUS ASSESSED: CPT | Performed by: INTERNAL MEDICINE

## 2024-11-12 PROCEDURE — 1160F RVW MEDS BY RX/DR IN RCRD: CPT | Performed by: INTERNAL MEDICINE

## 2024-11-12 PROCEDURE — G0439 PPPS, SUBSEQ VISIT: HCPCS | Performed by: INTERNAL MEDICINE

## 2024-11-12 PROCEDURE — 99214 OFFICE O/P EST MOD 30 MIN: CPT | Performed by: INTERNAL MEDICINE

## 2024-11-12 PROCEDURE — 36415 COLL VENOUS BLD VENIPUNCTURE: CPT | Performed by: INTERNAL MEDICINE

## 2024-11-12 RX ORDER — LIDOCAINE 50 MG/G
1 PATCH TOPICAL EVERY 24 HOURS
Qty: 60 PATCH | Refills: 11 | Status: SHIPPED | OUTPATIENT
Start: 2024-11-12

## 2024-11-12 RX ORDER — DOXEPIN HYDROCHLORIDE 150 MG/1
150 CAPSULE ORAL NIGHTLY
Qty: 90 CAPSULE | Refills: 3 | Status: SHIPPED | OUTPATIENT
Start: 2024-11-12

## 2024-11-12 NOTE — ASSESSMENT & PLAN NOTE
Refer for updated for yearly bilateral screening mammogram  Orders:    Mammo Screening Digital Tomosynthesis Bilateral With CAD; Future

## 2024-11-12 NOTE — ASSESSMENT & PLAN NOTE
Being managed with doxepin 100 mg nightly and gabapentin 800 mg 3 times daily.  Doxepin to be raised to 150 mg nightly given increasing osteoarthritic pain with left-sided sciatica.  Orders:    doxepin (SINEquan) 150 MG capsule; Take 1 capsule by mouth Every Night.

## 2024-11-12 NOTE — ASSESSMENT & PLAN NOTE
Patient's (Body mass index is 28.12 kg/m².) indicates that they are overweight with health conditions that include  . We discussed ongoing efforts at healthy lifestyle with diet and exercise..

## 2024-11-12 NOTE — ASSESSMENT & PLAN NOTE
Chronic lumbago, more acutely worse on the left with sciatica, having previously seen pain management status post RFA of L3-L5 in 8/2023, not following up subsequently having declined opiate therapy.  She is taking gabapentin 800 mg 3 times daily with only modest benefit, old prescription of Lidoderm with some benefit, noting no improvement with previous Voltaren gel, along with doxepin 100 mg nightly.  We discussed treatment options including referral back to pain management, which she declines at this time, need to avoid NSAIDs given her chronic kidney disease, noting appropriate dose of gabapentin, thus plan increase doxepin up to 150 mg daily with side effect profile discussed, and prescribed Lidoderm 5 mg patches 1 or 2 topically as needed.  Assess clinical response.  Follow-up in 6 months  Orders:    lidocaine (LIDODERM) 5 %; Place 1 patch on the skin as directed by provider Daily. Remove & Discard patch within 12 hours or as directed by MD    doxepin (SINEquan) 150 MG capsule; Take 1 capsule by mouth Every Night.

## 2024-11-12 NOTE — ASSESSMENT & PLAN NOTE
DEXA scan from 11/2022 revealing osteopenia.  Taking calcium 600 mg plus D twice daily.  Repeat DEXA scan.

## 2024-11-12 NOTE — PROGRESS NOTES
Subjective   The ABCs of the Annual Wellness Visit  Medicare Wellness Visit      Kymberly Real is a 74 y.o. patient who presents for a Medicare Wellness Visit.    The following portions of the patient's history were reviewed and   updated as appropriate: allergies, current medications, past family history, past medical history, past social history, past surgical history, and problem list.    Compared to one year ago, the patient's physical   health is the same.  Compared to one year ago, the patient's mental   health is the same.    Recent Hospitalizations:  She was not admitted to the hospital during the last year.     Current Medical Providers:  Patient Care Team:  Kai Cotas MD as PCP - General (Internal Medicine)  Kai Coats MD as Referring Physician (Internal Medicine)    Outpatient Medications Prior to Visit   Medication Sig Dispense Refill    albuterol sulfate  (90 Base) MCG/ACT inhaler Inhale 2 puffs Every 4 (Four) Hours As Needed for Wheezing. 18 g 3    Calcium Carbonate-Vitamin D 600-10 MG-MCG per tablet Take 1 tablet by mouth 2 (Two) Times a Day. 180 tablet 2    gabapentin (NEURONTIN) 800 MG tablet Take 1 tablet by mouth 3 (Three) Times a Day. 90 tablet 5    hydrOXYzine (ATARAX) 25 MG tablet TAKE 1 TABLET BY MOUTH THREE TIMES DAILY AS NEEDED FOR  ITCHING ,ALLERGIES  OR  ANXIETY 30 tablet 3    lisinopril (PRINIVIL,ZESTRIL) 10 MG tablet Take 1 tablet by mouth once daily 90 tablet 2    magnesium oxide (MAG-OX) 400 MG tablet Take 1 tablet by mouth Daily.      Omega-3 Fatty Acids (fish oil) 1000 MG capsule capsule Take 1 capsule by mouth Daily With Breakfast. 90 capsule 2    polyethylene glycol (MiraLax) 17 GM/SCOOP powder 1/2-1 capful daily mixed with glass of juice or water, titrating to maintain 1-2 soft bowel movements daily. 527 g 5    pravastatin (PRAVACHOL) 40 MG tablet TAKE 1 TABLET BY MOUTH ONCE DAILY AT BEDTIME 90 tablet 1    rOPINIRole (REQUIP) 1 MG tablet TAKE 1 TABLET BY  MOUTH AT NIGHT ONE HOUR PRIOR TO  BEDTIME 90 tablet 2    doxepin (SINEquan) 100 MG capsule Take 1 capsule by mouth Every Night. 90 capsule 3    HYDROcodone-acetaminophen (NORCO) 5-325 MG per tablet Take 1 tablet by mouth Every 8 (Eight) Hours As Needed for Moderate Pain or Severe Pain. for 30 days.       No facility-administered medications prior to visit.     No opioid medication identified on active medication list. I have reviewed chart for other potential  high risk medication/s and harmful drug interactions in the elderly.      Aspirin is not on active medication list.  Aspirin use is not indicated based on review of current medical condition/s. Risk of harm outweighs potential benefits.  .    Patient Active Problem List   Diagnosis    Chronic cough    COPD (chronic obstructive pulmonary disease)    Disorder of bone density and structure, unspecified    Dyslipidemia    Essential (primary) hypertension    Fibromyalgia    History of migraine headaches    Osteopenia    Overweight (BMI 25.0-29.9)    Restless leg syndrome    Cigarette smoker    Stage 3a chronic kidney disease    Cigarette nicotine dependence in remission    Suprapubic pressure    Chronic bilateral low back pain with bilateral sciatica    Hyperglycemia    Encounter for general adult medical examination with abnormal findings    Vitamin D deficiency    Screening for thyroid disorder    Screening for lung cancer    Encounter for screening mammogram for malignant neoplasm of breast    History of colon polyps    Constipation    Muscle cramps at night    Hearing loss    Chronic pain of both shoulders    Urticaria    Chronic right shoulder pain    Medicare annual wellness visit, subsequent    PVC's (premature ventricular contractions)     Advance Care Planning Advance Directive is not on file.  ACP discussion was held with the patient during this visit. Patient does not have an advance directive, information provided.            Objective   Vitals:     "24 1317   BP: 122/80   BP Location: Left arm   Patient Position: Sitting   Cuff Size: Adult   Pulse: 72   Resp: 18   Temp: 98.2 °F (36.8 °C)   TempSrc: Temporal   SpO2: 99%   Weight: 65.3 kg (144 lb)   Height: 152.4 cm (60\")       Estimated body mass index is 28.12 kg/m² as calculated from the following:    Height as of this encounter: 152.4 cm (60\").    Weight as of this encounter: 65.3 kg (144 lb).            Does the patient have evidence of cognitive impairment? No       ECG 12 Lead    Date/Time: 2024 6:20 PM  Performed by: Kai Coats MD    Authorized by: Kai Coats MD  Comparison: compared with previous ECG from 2023  Similar to previous ECG  Comparison to previous ECG: Normal sinus rhythm rate 72, with 2 unifocal PVCs noted, no abnormalities noted otherwise, only change from prior EKG being noted PVCs                                                                                                Health  Risk Assessment    Smoking Status:  Social History     Tobacco Use   Smoking Status Every Day    Current packs/day: 0.25    Average packs/day: 0.3 packs/day for 50.0 years (12.5 ttl pk-yrs)    Types: Cigarettes   Smokeless Tobacco Never   Tobacco Comments    IN ADVANCED MD SAYS NON SMOKER     Alcohol Consumption:  Social History     Substance and Sexual Activity   Alcohol Use Not Currently    Comment: [facility]ER       Fall Risk Screen  STEADI Fall Risk Assessment was completed, and patient is at LOW risk for falls.Assessment completed on:2024    Depression Screening   Little interest or pleasure in doing things? Not at all   Feeling down, depressed, or hopeless? Not at all   PHQ-2 Total Score 0      Health Habits and Functional and Cognitive Screenin/12/2024     1:19 PM   Functional & Cognitive Status   Do you have difficulty preparing food and eating? No   Do you have difficulty bathing yourself, getting dressed or grooming yourself? No   Do you have difficulty using " the toilet? No   Do you have difficulty moving around from place to place? No   Do you have trouble with steps or getting out of a bed or a chair? No   Current Diet Well Balanced Diet   Dental Exam Up to date   Eye Exam Up to date   Exercise (times per week) 0 times per week   Current Exercises Include No Regular Exercise   Do you need help using the phone?  No   Are you deaf or do you have serious difficulty hearing?  Yes   Do you need help to go to places out of walking distance? No   Do you need help shopping? No   Do you need help preparing meals?  No   Do you need help with housework?  No   Do you need help with laundry? No   Do you need help taking your medications? No   Do you need help managing money? No   Do you ever drive or ride in a car without wearing a seat belt? No   Have you felt unusual stress, anger or loneliness in the last month? No   Who do you live with? Alone   If you need help, do you have trouble finding someone available to you? No   Have you been bothered in the last four weeks by sexual problems? No   Do you have difficulty concentrating, remembering or making decisions? No           Age-appropriate Screening Schedule:  Refer to the list below for future screening recommendations based on patient's age, sex and/or medical conditions. Orders for these recommended tests are listed in the plan section. The patient has been provided with a written plan.    Health Maintenance List  Health Maintenance   Topic Date Due    ZOSTER VACCINE (2 of 3) 12/08/2017    INFLUENZA VACCINE  08/01/2024    COVID-19 Vaccine (8 - 2024-25 season) 09/01/2024    LIPID PANEL  11/07/2024    DXA SCAN  12/07/2024    COLORECTAL CANCER SCREENING  05/25/2025    BMI FOLLOWUP  06/13/2025    TDAP/TD VACCINES (2 - Td or Tdap) 07/13/2025    ANNUAL WELLNESS VISIT  11/12/2025    MAMMOGRAM  11/21/2025    HEPATITIS C SCREENING  Completed    Pneumococcal Vaccine 65+  Completed                                                                                                                                                 CMS Preventative Services Quick Reference  Risk Factors Identified During Encounter  Chronic Pain:  continue prescribed pain medications  Hearing Problem:  declines audiology referral  Immunizations Discussed/Encouraged: Shingrix and RSV (Respiratory Syncytial Virus)  Tobacco Use/Dependance Risk (use dotphrase .tobaccocessation for documentation)  Vision Screening Recommended    The above risks/problems have been discussed with the patient.  Pertinent information has been shared with the patient in the After Visit Summary.  An After Visit Summary and PPPS were made available to the patient.    Follow Up:   Next Medicare Wellness visit to be scheduled in 1 year.         Additional E&M Note during same encounter follows:  Patient has additional, significant, and separately identifiable condition(s)/problem(s) that require work above and beyond the Medicare Wellness Visit     Chief Complaint  Medicare Wellness-subsequent    Subjective    HPI  Kymbrely is also being seen today for an annual adult preventative physical exam.  Patient's primary complaint relates to significant increase in her chronic pain, primarily lumbar with left-sided sciatica.  She notes worsening in the last month.  She is being maintained on a regimen of gabapentin 800 mg 3 times daily and doxepin 50 mg nightly, the latter also to help migraine headaches.  She has been taking some Aleve additionally, though she has been advised previously to avoid NSAIDs given chronic kidney disease.  Did most recently see pain management 1 year prior, Dr. Richmond, having apparently had RFA ministered L3-L5 in 8/2023, patient indicating no significant benefit.  She had declined option of opiate therapy at the time.  Is interested in some other treatment modalities, noting she has gotten some benefit additionally with Lidoderm patches.  Has ROS for which she gets benefit taking her  "gabapentin and Requip, hypertension with good control taking lisinopril 10 mg daily denies chest pains palpitations dyspnea dizziness or edema, hyperlipidemia taking pravastatin 40 mg nightly, chronic kidney disease stage IIIa with most recent creatinine 1.19 and GFR 49 in 11/2023.  Ongoing cigarette smoker of 40 pack years, and last couple years reduced to 5 cigarettes daily, rarely using albuterol for minimal COPD.  Hypomagnesemia on supplementation, chronic constipation doing well with MiraLAX, history of osteopenia per DEXA scan in 11/2022 taking calcium plus vitamin D twice daily.  History of multiple colon polyps per colonoscopy in 5/2022 with a 3-year follow-up recommended, denying any GI complaints.  No  complaints, postmenopausal.  Review of systems otherwise unremarkable.  Health maintenance includes mammogram from 11/21/2023 with update required in the near future, DEXA scan from 11/2022 revealing osteopenia with update due, low to screening chest CT from 11/21/2023 with update pending, colonoscopy from 5/2022 with multiple polyps, 3-year follow-up recommended, patient indicates influenza and COVID vaccines current through pharmacy, declines Shingrix vaccine and RSV secondary to lack of insurance coverage, EKG today revealing occasional PVC otherwise unremarkable, update screening labs.              Objective   Vital Signs:  /80 (BP Location: Left arm, Patient Position: Sitting, Cuff Size: Adult)   Pulse 72   Temp 98.2 °F (36.8 °C) (Temporal)   Resp 18   Ht 152.4 cm (60\")   Wt 65.3 kg (144 lb)   SpO2 99%   BMI 28.12 kg/m²   Physical Exam  Vitals and nursing note reviewed. Exam conducted with a chaperone present.   Constitutional:       General: She is not in acute distress.     Appearance: Normal appearance. She is not ill-appearing.      Comments: Pleasant healthy alert and oriented, NAD, smaller stature, BMI mildly elevated at 28.1   HENT:      Head: Normocephalic and atraumatic.      " Right Ear: Tympanic membrane, ear canal and external ear normal.      Left Ear: Tympanic membrane, ear canal and external ear normal.      Nose: Nose normal. No congestion or rhinorrhea.      Mouth/Throat:      Mouth: Mucous membranes are moist.      Pharynx: Oropharynx is clear.      Comments: Upper denture plate with residual mandibular dentition appear to be in good condition with some malalignment  Eyes:      Extraocular Movements: Extraocular movements intact.      Conjunctiva/sclera: Conjunctivae normal.      Pupils: Pupils are equal, round, and reactive to light.   Neck:      Vascular: No carotid bruit.      Comments: No periclavicular or axillary or inguinal adenopathy  Cardiovascular:      Rate and Rhythm: Normal rate and regular rhythm.      Pulses: Normal pulses.      Heart sounds: Normal heart sounds. No murmur heard.     No friction rub. No gallop.      Comments: 2+ carotids without bruits, 2+ radial pulses, 2+ femoral pulses without bruits, 2+ bipedal pulses with good perfusion and no dependent edema  Pulmonary:      Effort: Pulmonary effort is normal. No respiratory distress.      Breath sounds: Normal breath sounds.      Comments: No cough  Chest:   Breasts:     Kaushal Score is 5.      Breasts are symmetrical.      Right: Normal. No swelling, bleeding, inverted nipple, mass, nipple discharge, skin change or tenderness.      Left: Normal. No swelling, bleeding, inverted nipple, mass, nipple discharge, skin change or tenderness.   Abdominal:      General: Bowel sounds are normal. There is no distension.      Palpations: Abdomen is soft. There is no mass.      Tenderness: There is no abdominal tenderness. There is no guarding or rebound.      Hernia: No hernia is present.      Comments: Nontender nondistended with no organomegaly or masses   Genitourinary:     Comments:  exam deferred today  Musculoskeletal:         General: Tenderness present. No swelling, deformity or signs of injury. Normal range  of motion.      Cervical back: Normal range of motion and neck supple. No rigidity or tenderness.      Right lower leg: No edema.      Left lower leg: No edema.      Comments: Right shoulder with minimal discomfort to abduction internal and external rotation, overall improved, significant tenderness palpation in her lumbar region diffusely at the belt line, most notably in the left SI region, negative straight leg raise bilaterally at this time, difficult to elicit reflexes in her lower extremity   Lymphadenopathy:      Cervical: No cervical adenopathy.      Upper Body:      Right upper body: No supraclavicular or axillary adenopathy.      Left upper body: No supraclavicular or axillary adenopathy.   Skin:     General: Skin is warm and dry.      Capillary Refill: Capillary refill takes less than 2 seconds.      Findings: No lesion or rash.   Neurological:      General: No focal deficit present.      Mental Status: She is alert and oriented to person, place, and time. Mental status is at baseline.      Cranial Nerves: No cranial nerve deficit.      Sensory: No sensory deficit.      Motor: No weakness.      Coordination: Coordination normal.      Gait: Gait normal.      Comments: Difficult to elicit knee jerks and ankle jerks bilaterally but symmetrically   Psychiatric:         Mood and Affect: Mood normal.         Behavior: Behavior normal.         Thought Content: Thought content normal.         Judgment: Judgment normal.            The following data was reviewed by: Kai Coats MD on 11/12/2024:                  Assessment and Plan Additional age appropriate preventative wellness advice topics were discussed during today's preventative wellness exam(some topics already addressed during AWV portion of the note above):    Physical Activity: Advised cardiovascular activity 150 minutes per week as tolerated. (example brisk walk for 30 minutes, 5 days a week).     Nutrition: Discussed nutrition plan with patient.  Information shared in after visit summary. Goal is for a well balanced diet to enhance overall health.     Healthy Weight: Discussed current and goal BMI with patient. Steps to attain this goal discussed. Information shared in after visit summary.       Medicare annual wellness visit, subsequent         Encounter for general adult medical examination with abnormal findings  74-year-old female presenting for Medicare wellness visit as well as general health review.  Specific health issues being addressed as detailed below, health maintenance includes DEXA scan from 11/2022 to be updated, low to screening chest CT to be updated, mammogram to be updated, colonoscopy from 5/2022 with multiple polyps, 3-year follow-up recommended, relating to 5/2024 with referral to be made, patient indicates current with influenza and COVID-19 vaccines through pharmacy, unable to afford recommended Shingrix and RSV vaccines, EKG today revealing unipolar PVCs otherwise normal, update screening labs.    Orders:    Ambulatory Referral For Screening Colonoscopy    lidocaine (LIDODERM) 5 %; Place 1 patch on the skin as directed by provider Daily. Remove & Discard patch within 12 hours or as directed by MD    Mammo Screening Digital Tomosynthesis Bilateral With CAD; Future     CT Chest Low Dose Cancer Screening WO; Future    TSH Rfx On Abnormal To Free T4; Future    Comprehensive Metabolic Panel; Future    Lipid Panel; Future    Hemoglobin A1c; Future    Vitamin D,25-Hydroxy; Future    Urinalysis With Culture If Indicated -; Future    Magnesium; Future    Urinalysis With Culture If Indicated - Urine, Clean Catch    Vitamin D,25-Hydroxy    Hemoglobin A1c    Lipid Panel    Comprehensive Metabolic Panel    TSH Rfx On Abnormal To Free T4    Magnesium    PVC's (premature ventricular contractions)  EKG today revealing 2 unifocal PVCs, completely asymptomatic.  No ischemia.  Update electrolytes and her CMP as well as magnesium to ensure stable.   Observation otherwise being pursued    Orders:    Comprehensive Metabolic Panel; Future    Magnesium; Future    Comprehensive Metabolic Panel    Magnesium    Essential (primary) hypertension  Satisfied blood pressure control acutely taking lisinopril 10 mg daily.  Advised start monitoring blood pressures chronically with ideal parameters discussed, and reassess clinically at follow-up visit    Orders:    ECG 12 Lead    Comprehensive Metabolic Panel; Future    Urinalysis With Culture If Indicated -; Future    Urinalysis With Culture If Indicated - Urine, Clean Catch    Comprehensive Metabolic Panel    Dyslipidemia  Prescribed pravastatin 40 mg daily.  Update lipid profile.  Orders:    Lipid Panel; Future    Lipid Panel    Overweight (BMI 25.0-29.9)  Patient's (Body mass index is 28.12 kg/m².) indicates that they are overweight with health conditions that include  . We discussed ongoing efforts at healthy lifestyle with diet and exercise..          Vitamin D deficiency  Taking vitamin D 800 IU daily and her calcium supplement.  Update level.  Orders:    Vitamin D,25-Hydroxy; Future    Vitamin D,25-Hydroxy    Magnesium deficiency  Taking magnesium 400 mg daily.  Update level.  Orders:    Magnesium; Future    Magnesium    Stage 3a chronic kidney disease  Most recent creatinine 1.19 with GFR 49 in 11/2023, taking lisinopril 10 mg daily.  Update renal function, and continue to update every 6 months.         Constipation, unspecified constipation type  Reports overall good control of her constipation taking MiraLAX as needed.  Colonoscopy current from 5/2022 with a 3-year follow-up recommended given multiple polyps noted.       Chronic bilateral low back pain with bilateral sciatica  Chronic lumbago, more acutely worse on the left with sciatica, having previously seen pain management status post RFA of L3-L5 in 8/2023, not following up subsequently having declined opiate therapy.  She is taking gabapentin 800 mg 3 times  daily with only modest benefit, old prescription of Lidoderm with some benefit, noting no improvement with previous Voltaren gel, along with doxepin 100 mg nightly.  We discussed treatment options including referral back to pain management, which she declines at this time, need to avoid NSAIDs given her chronic kidney disease, noting appropriate dose of gabapentin, thus plan increase doxepin up to 150 mg daily with side effect profile discussed, and prescribed Lidoderm 5 mg patches 1 or 2 topically as needed.  Assess clinical response.  Follow-up in 6 months  Orders:    lidocaine (LIDODERM) 5 %; Place 1 patch on the skin as directed by provider Daily. Remove & Discard patch within 12 hours or as directed by MD    doxepin (SINEquan) 150 MG capsule; Take 1 capsule by mouth Every Night.    Fibromyalgia  Being managed with doxepin 100 mg nightly and gabapentin 800 mg 3 times daily.  Doxepin to be raised to 150 mg nightly given increasing osteoarthritic pain with left-sided sciatica.  Orders:    doxepin (SINEquan) 150 MG capsule; Take 1 capsule by mouth Every Night.    Osteopenia of both hips  DEXA scan from 11/2022 revealing osteopenia.  Taking calcium 600 mg plus D twice daily.  Repeat DEXA scan.       Cigarette smoker  40-pack-year history of cigarette smoking, currently reduced down to 0.25 packs/day over the last several years.  Update yearly low-dose screening chest CT.  Strongly vies need to stop smoking, patient not significantly interested in doing so at this time  Orders:     CT Chest Low Dose Cancer Screening WO; Future    Screening for lung cancer  40-pack-year history of cigarette smoking, currently reduced down to 0.25 packs/day over the last several years.  Update yearly low-dose screening chest CT.  Strongly vies need to stop smoking, patient not significantly interested in doing so at this time  Orders:     CT Chest Low Dose Cancer Screening WO; Future    Encounter for screening mammogram for malignant  neoplasm of breast  Refer for updated for yearly bilateral screening mammogram  Orders:    Mammo Screening Digital Tomosynthesis Bilateral With CAD; Future    Encounter for screening for malignant neoplasm of colon  Most recent colonoscopy in 5/2022 revealing multiple polyps with a 3-year follow-up recommended, correlating to 5/2025.  Currently not having any related symptoms.  Will make appropriate referral to correlate to 5/2025.  Orders:    Ambulatory Referral For Screening Colonoscopy    Screening for thyroid disorder    Orders:    TSH Rfx On Abnormal To Free T4; Future    TSH Rfx On Abnormal To Free T4    Screening for diabetes mellitus    Orders:    Hemoglobin A1c; Future    Hemoglobin A1c          Follow Up   Return in about 6 months (around 5/12/2025) for Recheck.  Patient was given instructions and counseling regarding her condition or for health maintenance advice. Please see specific information pulled into the AVS if appropriate.

## 2024-11-12 NOTE — ASSESSMENT & PLAN NOTE
Satisfied blood pressure control acutely taking lisinopril 10 mg daily.  Advised start monitoring blood pressures chronically with ideal parameters discussed, and reassess clinically at follow-up visit    Orders:    ECG 12 Lead    Comprehensive Metabolic Panel; Future    Urinalysis With Culture If Indicated -; Future    Urinalysis With Culture If Indicated - Urine, Clean Catch    Comprehensive Metabolic Panel

## 2024-11-12 NOTE — ASSESSMENT & PLAN NOTE
74-year-old female presenting for Medicare wellness visit as well as general health review.  Specific health issues being addressed as detailed below, health maintenance includes DEXA scan from 11/2022 to be updated, low to screening chest CT to be updated, mammogram to be updated, colonoscopy from 5/2022 with multiple polyps, 3-year follow-up recommended, relating to 5/2024 with referral to be made, patient indicates current with influenza and COVID-19 vaccines through pharmacy, unable to afford recommended Shingrix and RSV vaccines, EKG today revealing unipolar PVCs otherwise normal, update screening labs.    Orders:    Ambulatory Referral For Screening Colonoscopy    lidocaine (LIDODERM) 5 %; Place 1 patch on the skin as directed by provider Daily. Remove & Discard patch within 12 hours or as directed by MD    Mammjaskaran Screening Digital Tomosynthesis Bilateral With CAD; Future     CT Chest Low Dose Cancer Screening WO; Future    TSH Rfx On Abnormal To Free T4; Future    Comprehensive Metabolic Panel; Future    Lipid Panel; Future    Hemoglobin A1c; Future    Vitamin D,25-Hydroxy; Future    Urinalysis With Culture If Indicated -; Future    Magnesium; Future    Urinalysis With Culture If Indicated - Urine, Clean Catch    Vitamin D,25-Hydroxy    Hemoglobin A1c    Lipid Panel    Comprehensive Metabolic Panel    TSH Rfx On Abnormal To Free T4    Magnesium

## 2024-11-12 NOTE — ASSESSMENT & PLAN NOTE
Taking vitamin D 800 IU daily and her calcium supplement.  Update level.  Orders:    Vitamin D,25-Hydroxy; Future    Vitamin D,25-Hydroxy

## 2024-11-12 NOTE — ASSESSMENT & PLAN NOTE
EKG today revealing 2 unifocal PVCs, completely asymptomatic.  No ischemia.  Update electrolytes and her CMP as well as magnesium to ensure stable.  Observation otherwise being pursued    Orders:    Comprehensive Metabolic Panel; Future    Magnesium; Future    Comprehensive Metabolic Panel    Magnesium

## 2024-11-12 NOTE — ASSESSMENT & PLAN NOTE
Reports overall good control of her constipation taking MiraLAX as needed.  Colonoscopy current from 5/2022 with a 3-year follow-up recommended given multiple polyps noted.

## 2024-11-12 NOTE — ASSESSMENT & PLAN NOTE
Prescribed pravastatin 40 mg daily.  Update lipid profile.  Orders:    Lipid Panel; Future    Lipid Panel

## 2024-11-12 NOTE — ASSESSMENT & PLAN NOTE
Most recent creatinine 1.19 with GFR 49 in 11/2023, taking lisinopril 10 mg daily.  Update renal function, and continue to update every 6 months.

## 2024-11-12 NOTE — PROGRESS NOTES
Venipuncture Blood Specimen Collection  Venipuncture performed in right arm by Amie Hart MA with good hemostasis. Patient tolerated the procedure well without complications.   11/12/24   Amie Hart MA

## 2024-11-12 NOTE — ASSESSMENT & PLAN NOTE
40-pack-year history of cigarette smoking, currently reduced down to 0.25 packs/day over the last several years.  Update yearly low-dose screening chest CT.  Strongly vies need to stop smoking, patient not significantly interested in doing so at this time  Orders:     CT Chest Low Dose Cancer Screening WO; Future

## 2024-11-13 LAB
25(OH)D3+25(OH)D2 SERPL-MCNC: 54.8 NG/ML (ref 30–100)
ALBUMIN SERPL-MCNC: 4.7 G/DL (ref 3.8–4.8)
ALP SERPL-CCNC: 73 IU/L (ref 44–121)
ALT SERPL-CCNC: 18 IU/L (ref 0–32)
AST SERPL-CCNC: 19 IU/L (ref 0–40)
BILIRUB SERPL-MCNC: 0.2 MG/DL (ref 0–1.2)
BUN SERPL-MCNC: 42 MG/DL (ref 8–27)
BUN/CREAT SERPL: 35 (ref 12–28)
CALCIUM SERPL-MCNC: 10.9 MG/DL (ref 8.7–10.3)
CHLORIDE SERPL-SCNC: 101 MMOL/L (ref 96–106)
CHOLEST SERPL-MCNC: 176 MG/DL (ref 100–199)
CO2 SERPL-SCNC: 22 MMOL/L (ref 20–29)
CREAT SERPL-MCNC: 1.21 MG/DL (ref 0.57–1)
EGFRCR SERPLBLD CKD-EPI 2021: 47 ML/MIN/1.73
GLOBULIN SER CALC-MCNC: 2.4 G/DL (ref 1.5–4.5)
GLUCOSE SERPL-MCNC: 90 MG/DL (ref 70–99)
HBA1C MFR BLD: 5.7 % (ref 4.8–5.6)
HDLC SERPL-MCNC: 39 MG/DL
LDLC SERPL CALC-MCNC: 92 MG/DL (ref 0–99)
MAGNESIUM SERPL-MCNC: 2.2 MG/DL (ref 1.6–2.3)
POTASSIUM SERPL-SCNC: 4.7 MMOL/L (ref 3.5–5.2)
PROT SERPL-MCNC: 7.1 G/DL (ref 6–8.5)
SODIUM SERPL-SCNC: 139 MMOL/L (ref 134–144)
TRIGL SERPL-MCNC: 267 MG/DL (ref 0–149)
TSH SERPL DL<=0.005 MIU/L-ACNC: 1.6 UIU/ML (ref 0.45–4.5)
VLDLC SERPL CALC-MCNC: 45 MG/DL (ref 5–40)

## 2024-11-15 ENCOUNTER — TELEPHONE (OUTPATIENT)
Dept: FAMILY MEDICINE CLINIC | Facility: CLINIC | Age: 74
End: 2024-11-15
Payer: MEDICARE

## 2024-11-15 DIAGNOSIS — N30.00 ACUTE CYSTITIS WITHOUT HEMATURIA: Primary | ICD-10-CM

## 2024-11-15 LAB
APPEARANCE UR: CLEAR
BACTERIA #/AREA URNS HPF: ABNORMAL /[HPF]
BACTERIA UR CULT: ABNORMAL
BACTERIA UR CULT: ABNORMAL
BILIRUB UR QL STRIP: NEGATIVE
CASTS URNS QL MICRO: ABNORMAL /LPF
COLOR UR: YELLOW
EPI CELLS #/AREA URNS HPF: >10 /HPF (ref 0–10)
GLUCOSE UR QL STRIP: NEGATIVE
HGB UR QL STRIP: NEGATIVE
KETONES UR QL STRIP: NEGATIVE
LEUKOCYTE ESTERASE UR QL STRIP: ABNORMAL
MICRO URNS: ABNORMAL
NITRITE UR QL STRIP: NEGATIVE
OTHER ANTIBIOTIC SUSC ISLT: ABNORMAL
PH UR STRIP: 6 [PH] (ref 5–7.5)
PROT UR QL STRIP: NEGATIVE
RBC #/AREA URNS HPF: ABNORMAL /HPF (ref 0–2)
SP GR UR STRIP: 1.01 (ref 1–1.03)
URINALYSIS REFLEX: ABNORMAL
UROBILINOGEN UR STRIP-MCNC: 0.2 MG/DL (ref 0.2–1)
WBC #/AREA URNS HPF: ABNORMAL /HPF (ref 0–5)

## 2024-11-15 RX ORDER — NITROFURANTOIN 25; 75 MG/1; MG/1
100 CAPSULE ORAL 2 TIMES DAILY
Qty: 10 CAPSULE | Refills: 0 | Status: SHIPPED | OUTPATIENT
Start: 2024-11-15 | End: 2024-11-20

## 2024-11-16 NOTE — TELEPHONE ENCOUNTER
All conversation with patient regarding results of laboratory testing from 11/12/2024 as follows:    Urinalysis with 11-30 WBCs, 0 RBCs, moderate bacteria, with culture growing greater than 100,000 colonies of E. coli with multiple resistances, sensitive to quinolones, cephalosporins and Bactrim.  Magnesium, TSH, vitamin D are all normal  CMP reveals a stable creatinine 1.21 with GFR 47, total calcium elevated 10.9 with corrected calcium normal at 10.3, otherwise normal  Hemoglobin A1c mildly elevated at 5.7%  Total cholesterol 176, triglyceride 267, HDL 39, LDL 92    Assessment/plan:  1.  E. coli cystitis.  Given resistances as well as patient allergies, will treat with Macrobid 100 mg twice daily x 5 days pushing plenty fluids.  Advise if any related problems.  2.  Chronic kidney disease, stage IIIa.  Clinically stable.  Repeat in 6 months.  3.  New diagnosis prediabetes.  Emphasize healthy lifestyle with diet and exercise and repeat testing in 6 months.  4.  Dyslipidemia.  Mildly elevated triglyceride level with satisfactory HDL and LDL.  Continue pravastatin 40 mg daily, and increase her fish oil from 2000 mg daily up to 4000 mg daily.  Repeat lipids in 1 year.  5.  Remainder of laboratory testing satisfactory.

## 2024-11-26 DIAGNOSIS — F17.210 CIGARETTE SMOKER: ICD-10-CM

## 2024-11-26 DIAGNOSIS — Z00.01 ENCOUNTER FOR GENERAL ADULT MEDICAL EXAMINATION WITH ABNORMAL FINDINGS: ICD-10-CM

## 2024-11-26 DIAGNOSIS — Z12.2 SCREENING FOR LUNG CANCER: ICD-10-CM

## 2024-11-26 DIAGNOSIS — Z12.31 ENCOUNTER FOR SCREENING MAMMOGRAM FOR MALIGNANT NEOPLASM OF BREAST: ICD-10-CM

## 2024-11-27 ENCOUNTER — TELEPHONE (OUTPATIENT)
Dept: FAMILY MEDICINE CLINIC | Facility: CLINIC | Age: 74
End: 2024-11-27
Payer: MEDICARE

## 2024-11-27 NOTE — TELEPHONE ENCOUNTER
----- Message from Kai Coats sent at 11/26/2024  6:55 PM EST -----  Please advise patient her recent bilateral screening mammogram was normal with no evidence of malignancy, and that she is advised to repeat another mammogram screening in 1 year per standard protocol.    I have spoke with her regarding her mamm results. TF

## 2024-11-27 NOTE — TELEPHONE ENCOUNTER
----- Message from Kai Coats sent at 11/26/2024  6:55 PM EST -----  Please advise patient her recent low-dose screening chest CT was stable with no radiographic evidence of lung cancer, that she is recommended to repeat another scan in 1 year per standard protocol.  Also strongly advised to stop cigarette smoking    I have spoke with her regarding her CT results. TF

## 2024-12-04 RX ORDER — LISINOPRIL 10 MG/1
10 TABLET ORAL DAILY
Qty: 90 TABLET | Refills: 2 | Status: SHIPPED | OUTPATIENT
Start: 2024-12-04

## 2024-12-04 RX ORDER — ROPINIROLE 1 MG/1
1 TABLET, FILM COATED ORAL NIGHTLY
Qty: 90 TABLET | Refills: 2 | Status: SHIPPED | OUTPATIENT
Start: 2024-12-04

## 2024-12-22 DIAGNOSIS — M54.41 CHRONIC BILATERAL LOW BACK PAIN WITH BILATERAL SCIATICA: ICD-10-CM

## 2024-12-22 DIAGNOSIS — M54.42 CHRONIC BILATERAL LOW BACK PAIN WITH BILATERAL SCIATICA: ICD-10-CM

## 2024-12-22 DIAGNOSIS — G25.81 RESTLESS LEG SYNDROME: ICD-10-CM

## 2024-12-22 DIAGNOSIS — G89.29 CHRONIC BILATERAL LOW BACK PAIN WITH BILATERAL SCIATICA: ICD-10-CM

## 2024-12-22 DIAGNOSIS — M79.7 FIBROMYALGIA: ICD-10-CM

## 2024-12-23 RX ORDER — GABAPENTIN 800 MG/1
800 TABLET ORAL 3 TIMES DAILY
Qty: 90 TABLET | Refills: 0 | Status: SHIPPED | OUTPATIENT
Start: 2024-12-23

## 2025-01-26 DIAGNOSIS — G25.81 RESTLESS LEG SYNDROME: ICD-10-CM

## 2025-01-26 DIAGNOSIS — M79.7 FIBROMYALGIA: ICD-10-CM

## 2025-01-26 DIAGNOSIS — M54.41 CHRONIC BILATERAL LOW BACK PAIN WITH BILATERAL SCIATICA: ICD-10-CM

## 2025-01-26 DIAGNOSIS — M54.42 CHRONIC BILATERAL LOW BACK PAIN WITH BILATERAL SCIATICA: ICD-10-CM

## 2025-01-26 DIAGNOSIS — G89.29 CHRONIC BILATERAL LOW BACK PAIN WITH BILATERAL SCIATICA: ICD-10-CM

## 2025-01-27 RX ORDER — GABAPENTIN 800 MG/1
800 TABLET ORAL 3 TIMES DAILY
Qty: 90 TABLET | Refills: 0 | Status: SHIPPED | OUTPATIENT
Start: 2025-01-27

## 2025-02-27 DIAGNOSIS — M54.42 CHRONIC BILATERAL LOW BACK PAIN WITH BILATERAL SCIATICA: ICD-10-CM

## 2025-02-27 DIAGNOSIS — G25.81 RESTLESS LEG SYNDROME: ICD-10-CM

## 2025-02-27 DIAGNOSIS — M54.41 CHRONIC BILATERAL LOW BACK PAIN WITH BILATERAL SCIATICA: ICD-10-CM

## 2025-02-27 DIAGNOSIS — M79.7 FIBROMYALGIA: ICD-10-CM

## 2025-02-27 DIAGNOSIS — G89.29 CHRONIC BILATERAL LOW BACK PAIN WITH BILATERAL SCIATICA: ICD-10-CM

## 2025-02-27 RX ORDER — PRAVASTATIN SODIUM 40 MG
40 TABLET ORAL
Qty: 90 TABLET | Refills: 1 | Status: SHIPPED | OUTPATIENT
Start: 2025-02-27

## 2025-02-27 RX ORDER — GABAPENTIN 800 MG/1
800 TABLET ORAL 3 TIMES DAILY
Qty: 90 TABLET | Refills: 0 | Status: SHIPPED | OUTPATIENT
Start: 2025-02-27

## 2025-03-27 DIAGNOSIS — G25.81 RESTLESS LEG SYNDROME: ICD-10-CM

## 2025-03-27 DIAGNOSIS — M54.41 CHRONIC BILATERAL LOW BACK PAIN WITH BILATERAL SCIATICA: ICD-10-CM

## 2025-03-27 DIAGNOSIS — M54.42 CHRONIC BILATERAL LOW BACK PAIN WITH BILATERAL SCIATICA: ICD-10-CM

## 2025-03-27 DIAGNOSIS — G89.29 CHRONIC BILATERAL LOW BACK PAIN WITH BILATERAL SCIATICA: ICD-10-CM

## 2025-03-27 DIAGNOSIS — M79.7 FIBROMYALGIA: ICD-10-CM

## 2025-03-27 RX ORDER — GABAPENTIN 800 MG/1
800 TABLET ORAL 3 TIMES DAILY
Qty: 90 TABLET | Refills: 0 | Status: SHIPPED | OUTPATIENT
Start: 2025-03-27

## 2025-04-26 DIAGNOSIS — L50.9 URTICARIA: ICD-10-CM

## 2025-04-26 DIAGNOSIS — G25.81 RESTLESS LEG SYNDROME: ICD-10-CM

## 2025-04-26 DIAGNOSIS — G89.29 CHRONIC BILATERAL LOW BACK PAIN WITH BILATERAL SCIATICA: ICD-10-CM

## 2025-04-26 DIAGNOSIS — M79.7 FIBROMYALGIA: ICD-10-CM

## 2025-04-26 DIAGNOSIS — M54.41 CHRONIC BILATERAL LOW BACK PAIN WITH BILATERAL SCIATICA: ICD-10-CM

## 2025-04-26 DIAGNOSIS — M54.42 CHRONIC BILATERAL LOW BACK PAIN WITH BILATERAL SCIATICA: ICD-10-CM

## 2025-04-28 RX ORDER — HYDROXYZINE HYDROCHLORIDE 25 MG/1
TABLET, FILM COATED ORAL
Qty: 30 TABLET | Refills: 0 | Status: SHIPPED | OUTPATIENT
Start: 2025-04-28

## 2025-04-28 RX ORDER — GABAPENTIN 800 MG/1
800 TABLET ORAL 3 TIMES DAILY
Qty: 90 TABLET | Refills: 5 | Status: SHIPPED | OUTPATIENT
Start: 2025-04-28

## 2025-05-12 ENCOUNTER — OFFICE VISIT (OUTPATIENT)
Dept: FAMILY MEDICINE CLINIC | Facility: CLINIC | Age: 75
End: 2025-05-12
Payer: MEDICARE

## 2025-05-12 VITALS
OXYGEN SATURATION: 96 % | SYSTOLIC BLOOD PRESSURE: 126 MMHG | HEART RATE: 76 BPM | WEIGHT: 144 LBS | BODY MASS INDEX: 28.27 KG/M2 | HEIGHT: 60 IN | TEMPERATURE: 98.2 F | DIASTOLIC BLOOD PRESSURE: 78 MMHG

## 2025-05-12 DIAGNOSIS — G89.29 CHRONIC BILATERAL LOW BACK PAIN WITH BILATERAL SCIATICA: ICD-10-CM

## 2025-05-12 DIAGNOSIS — M79.7 FIBROMYALGIA: ICD-10-CM

## 2025-05-12 DIAGNOSIS — Z12.11 SCREEN FOR COLON CANCER: ICD-10-CM

## 2025-05-12 DIAGNOSIS — Z01.818 PREOP EXAMINATION: Primary | ICD-10-CM

## 2025-05-12 DIAGNOSIS — G89.29 CHRONIC RIGHT SHOULDER PAIN: ICD-10-CM

## 2025-05-12 DIAGNOSIS — I10 ESSENTIAL (PRIMARY) HYPERTENSION: ICD-10-CM

## 2025-05-12 DIAGNOSIS — E66.3 OVERWEIGHT (BMI 25.0-29.9): ICD-10-CM

## 2025-05-12 DIAGNOSIS — R73.03 PREDIABETES: ICD-10-CM

## 2025-05-12 DIAGNOSIS — Z86.0100 HISTORY OF COLON POLYPS: ICD-10-CM

## 2025-05-12 DIAGNOSIS — J30.1 SEASONAL ALLERGIC RHINITIS DUE TO POLLEN: ICD-10-CM

## 2025-05-12 DIAGNOSIS — M54.42 CHRONIC BILATERAL LOW BACK PAIN WITH BILATERAL SCIATICA: ICD-10-CM

## 2025-05-12 DIAGNOSIS — F17.210 CIGARETTE SMOKER: ICD-10-CM

## 2025-05-12 DIAGNOSIS — M54.41 CHRONIC BILATERAL LOW BACK PAIN WITH BILATERAL SCIATICA: ICD-10-CM

## 2025-05-12 DIAGNOSIS — M25.511 CHRONIC RIGHT SHOULDER PAIN: ICD-10-CM

## 2025-05-12 DIAGNOSIS — N18.31 STAGE 3A CHRONIC KIDNEY DISEASE: ICD-10-CM

## 2025-05-12 PROCEDURE — 1125F AMNT PAIN NOTED PAIN PRSNT: CPT | Performed by: INTERNAL MEDICINE

## 2025-05-12 PROCEDURE — 1160F RVW MEDS BY RX/DR IN RCRD: CPT | Performed by: INTERNAL MEDICINE

## 2025-05-12 PROCEDURE — 3074F SYST BP LT 130 MM HG: CPT | Performed by: INTERNAL MEDICINE

## 2025-05-12 PROCEDURE — 3078F DIAST BP <80 MM HG: CPT | Performed by: INTERNAL MEDICINE

## 2025-05-12 PROCEDURE — 93000 ELECTROCARDIOGRAM COMPLETE: CPT | Performed by: INTERNAL MEDICINE

## 2025-05-12 PROCEDURE — 36415 COLL VENOUS BLD VENIPUNCTURE: CPT | Performed by: INTERNAL MEDICINE

## 2025-05-12 PROCEDURE — 99214 OFFICE O/P EST MOD 30 MIN: CPT | Performed by: INTERNAL MEDICINE

## 2025-05-12 PROCEDURE — 1159F MED LIST DOCD IN RCRD: CPT | Performed by: INTERNAL MEDICINE

## 2025-05-12 RX ORDER — FEXOFENADINE HCL 180 MG/1
180 TABLET ORAL DAILY
Qty: 90 TABLET | Refills: 3 | Status: SHIPPED | OUTPATIENT
Start: 2025-05-12

## 2025-05-12 RX ORDER — MONTELUKAST SODIUM 10 MG/1
10 TABLET ORAL NIGHTLY
Qty: 90 TABLET | Refills: 3 | Status: SHIPPED | OUTPATIENT
Start: 2025-05-12

## 2025-05-12 RX ORDER — PREDNISONE 20 MG/1
20 TABLET ORAL 2 TIMES DAILY
Qty: 12 TABLET | Refills: 0 | Status: SHIPPED | OUTPATIENT
Start: 2025-05-12 | End: 2025-05-18

## 2025-05-12 RX ORDER — FLUTICASONE PROPIONATE 50 MCG
2 SPRAY, SUSPENSION (ML) NASAL DAILY
Qty: 16 G | Refills: 5 | Status: SHIPPED | OUTPATIENT
Start: 2025-05-12

## 2025-05-12 NOTE — PROGRESS NOTES
..  Venipuncture Blood Specimen Collection  Venipuncture performed in left arm by Izabella Baker with good hemostasis. Patient tolerated the procedure well without complications.   05/12/25   Izabella Baker    [FreeTextEntry1] : \par

## 2025-05-12 NOTE — ASSESSMENT & PLAN NOTE
Colonoscopy on 5/25/2022 revealing multiple polyps, 3-year follow-up study recommended with Dr. Brennan.  Referral given for follow-up colonoscopy.

## 2025-05-12 NOTE — ASSESSMENT & PLAN NOTE
Maintaining very satisfactory blood pressure control acutely as well as chronically taking low-dose lisinopril 10 mg daily.  Continue current regimen with monitoring, pursue healthy lifestyle efforts with diet avoiding added salt and regular exercise along with recommendation for smoking cessation

## 2025-05-12 NOTE — ASSESSMENT & PLAN NOTE
Patient being referred for colonoscopy under conscious sedation.  EKG today unremarkable.  No concerning or physical findings.  Checking screening labs to ensure stable

## 2025-05-12 NOTE — ASSESSMENT & PLAN NOTE
Progression over the last year of acute on chronic right shoulder pain, x-rays in 6/2024 revealing mild degenerative arthritis.  She does have significant decreased mobility of the shoulder with positive Neer sign and positive Colin sign.  Plan treat with prednisone, and if not clinically improving her pain in the next 7 to 9 days, if she has any significant recurrence of pain after stopping the oral steroids, then she is to schedule appoint for consideration of intra-articular steroid injection into the shoulder

## 2025-05-12 NOTE — ASSESSMENT & PLAN NOTE
Multiple polyps noted on colonoscopy from 5/25/2022 with 3-year follow-up study recommended.  Referring for repeat colonoscopy

## 2025-05-12 NOTE — ASSESSMENT & PLAN NOTE
Hemoglobin A1c 5.7% in 11/2024, repeat testing pending.  Advised regarding need to pursue healthy lifestyle with diet and exercise.  Typically will repeat study in 6 months

## 2025-05-12 NOTE — ASSESSMENT & PLAN NOTE
Chronic lumbago, more acutely worse on the left with sciatica, having previously seen pain management status post RFA of L3-L5 in 8/2023, not following up subsequently having declined opiate therapy.  She is taking gabapentin 800 mg 3 times daily with only modest benefit, 6 months but having had an increase in doxepin from 100 mg up to 150 mg nightly.  Not certain the doxepin is really offering her any benefit in the pain.  She will try stopping the doxepin on a trial basis, and if she does note deterioration in her pain control at that stage, we will then reinitiate.  Avoiding NSAIDs given her chronic kidney disease.  Has also previously been prescribed Lidoderm patches to use as needed.  Patient not certain that doxepin is actually benefiting her pain.. We previously discussed treatment options including referral back to pain management, which she has declined.  Reassess in 6 months  SmartLink not supported outside of the Encounter Diagnoses SmartSection.

## 2025-05-12 NOTE — ASSESSMENT & PLAN NOTE
Continue Allegra, anticipating some benefit from prednisone, adding Flonase as needed, and add montelukast 10 mg daily prophylaxis.  Assess clinical response

## 2025-05-12 NOTE — ASSESSMENT & PLAN NOTE
Currently managed with gabapentin 800 mg 3 times daily which patient does feel helps with pain, but not certain the doxepin 150 mg nightly does benefit her pain.  She will try stopping the doxepin on a trial basis, and if the symptoms worsen then reinitiate

## 2025-05-12 NOTE — PROGRESS NOTES
Follow Up Office Visit      Date: 2025   Patient Name: Kymberly Real  : 1950   MRN: [id number]     Chief Complaint:    Chief Complaint   Patient presents with    follow up BP and pain rx       History of Present Illness: Kymberly Real is a 74 y.o. female who is here today for 6-month review, having prediabetes with a hemoglobin A1c of 5.7% in 2024, hypertension taking lisinopril with blood pressures in the 110s to 120s over 60s to 70s checked most days, denying chest pains palpitations dyspnea dizziness or edema, also having chronic kidney disease stage III with most recent creatinine 1.21 and GFR 47 in 2024, chronic low back pain with intermittent bilateral sciatica, and with fibromyalgia, prescribed gabapentin 800 mg 3 times daily, and doxepin 100 mg nightly, patient feeling gabapentin does help but uncertain that the doxepin is giving her any benefit.  She also notes significant right shoulder pain with any cuff movement, having had x-rays in 2024 which revealed mild arthritis.  Interested in targeted treatment.  Patient does have a history of chronic migraines, overall doing reasonly well from the standpoint, longstanding cigarette smoker 40 pack years, having quit for couple years then restarted several years ago at 5 cigarettes daily.  Not interested in smoking cessation.  She also has a history of multiple colon polyps, most recent colonoscopy 2022 with a 3-year follow-up study recommended, currently due.    Subjective      Review of Systems:   Review of Systems    I have reviewed the patients family history, social history, past medical history, past surgical history and have updated it as appropriate.     Medications:     Current Outpatient Medications:     albuterol sulfate  (90 Base) MCG/ACT inhaler, Inhale 2 puffs Every 4 (Four) Hours As Needed for Wheezing., Disp: 18 g, Rfl: 3    Calcium Carbonate-Vitamin D 600-10 MG-MCG per tablet, Take 1 tablet by mouth 2 (Two)  Times a Day., Disp: 180 tablet, Rfl: 2    doxepin (SINEquan) 150 MG capsule, Take 1 capsule by mouth Every Night., Disp: 90 capsule, Rfl: 3    gabapentin (NEURONTIN) 800 MG tablet, TAKE 1 TABLET BY MOUTH THREE TIMES DAILY, Disp: 90 tablet, Rfl: 5    lidocaine (LIDODERM) 5 %, Place 1 patch on the skin as directed by provider Daily. Remove & Discard patch within 12 hours or as directed by MD, Disp: 60 patch, Rfl: 11    lisinopril (PRINIVIL,ZESTRIL) 10 MG tablet, Take 1 tablet by mouth once daily, Disp: 90 tablet, Rfl: 2    magnesium oxide (MAG-OX) 400 MG tablet, Take 1 tablet by mouth Daily., Disp: , Rfl:     Omega-3 Fatty Acids (fish oil) 1000 MG capsule capsule, Take 1 capsule by mouth Daily With Breakfast., Disp: 90 capsule, Rfl: 2    polyethylene glycol (MiraLax) 17 GM/SCOOP powder, 1/2-1 capful daily mixed with glass of juice or water, titrating to maintain 1-2 soft bowel movements daily., Disp: 527 g, Rfl: 5    pravastatin (PRAVACHOL) 40 MG tablet, TAKE 1 TABLET BY MOUTH ONCE DAILY AT BEDTIME, Disp: 90 tablet, Rfl: 1    rOPINIRole (REQUIP) 1 MG tablet, TAKE 1 TABLET BY MOUTH NIGHTLY 1 HOUR BEFORE BEDTIME, Disp: 90 tablet, Rfl: 2    fexofenadine (Allegra Allergy) 180 MG tablet, Take 1 tablet by mouth Daily., Disp: 90 tablet, Rfl: 3    fluticasone (FLONASE) 50 MCG/ACT nasal spray, Administer 2 sprays into the nostril(s) as directed by provider Daily., Disp: 16 g, Rfl: 5    montelukast (Singulair) 10 MG tablet, Take 1 tablet by mouth Every Night., Disp: 90 tablet, Rfl: 3    predniSONE (DELTASONE) 20 MG tablet, Take 1 tablet by mouth 2 (Two) Times a Day for 6 days., Disp: 12 tablet, Rfl: 0    Allergies:   Allergies   Allergen Reactions    Duloxetine Swelling     Facial/neck swelling    Penicillins Hives    Cefdinir Rash       Objective     Physical Exam: Please see above  Vital Signs:   Vitals:    05/12/25 1303   BP: 126/78   BP Location: Left arm   Patient Position: Sitting   Cuff Size: Adult   Pulse: 76   Temp:  "98.2 °F (36.8 °C)   TempSrc: Temporal   SpO2: 96%   Weight: 65.3 kg (144 lb)   Height: 152.4 cm (60\")   PainSc: 8    PainLoc: Shoulder     Body mass index is 28.12 kg/m².          Physical Exam  Constitutional:       General: She is not in acute distress.     Appearance: Normal appearance. She is not ill-appearing.      Comments: Pleasant generally healthy, smaller statured with height 5 foot 0, BMI 28.1   Cardiovascular:      Rate and Rhythm: Normal rate.      Heart sounds: Normal heart sounds. No murmur heard.     No friction rub. No gallop.   Pulmonary:      Effort: Pulmonary effort is normal. No respiratory distress.      Breath sounds: Normal breath sounds. No stridor. No wheezing, rhonchi or rales.   Chest:      Chest wall: No tenderness.   Musculoskeletal:         General: Tenderness present. No swelling, deformity or signs of injury.      Cervical back: No rigidity or tenderness.      Right lower leg: No edema.      Left lower leg: No edema.      Comments: Significant decreased range of motion of her right shoulder with positive Neer sign and Colin sign both internal and external rotation, left shoulder with minimal discomfort to palpation, minimal positive Neer sign Colin sign, chronic low back tenderness to palpation, mild muscular tenderness on her thighs but not on her arms   Lymphadenopathy:      Cervical: No cervical adenopathy.   Skin:     Findings: No rash.   Neurological:      General: No focal deficit present.      Mental Status: She is alert.   Psychiatric:         Mood and Affect: Mood normal.           ECG 12 Lead    Date/Time: 5/14/2025 8:49 AM  Performed by: Kai Coats MD    Authorized by: Kai Coats MD  Comparison: compared with previous ECG from 11/12/2024  Similar to previous ECG  Comparison to previous ECG: Sinus rhythm rate 78, IVCD, no ischemia, no significant change versus previous EKG          Results:   Labs:   Hemoglobin A1C   Date Value Ref Range Status   05/12/2025 " 5.6 4.8 - 5.6 % Final     Comment:              Prediabetes: 5.7 - 6.4           Diabetes: >6.4           Glycemic control for adults with diabetes: <7.0       TSH   Date Value Ref Range Status   11/12/2024 1.600 0.450 - 4.500 uIU/mL Final        POCT Results (if applicable):   Results for orders placed or performed in visit on 05/12/25   Basic Metabolic Panel    Collection Time: 05/12/25  2:42 PM    Specimen: Arm, Left; Blood    Blood  Release to smiley   Result Value Ref Range    Glucose 83 70 - 99 mg/dL    BUN 34 (H) 8 - 27 mg/dL    Creatinine 1.16 (H) 0.57 - 1.00 mg/dL    EGFR Result 50 (L) >59 mL/min/1.73    BUN/Creatinine Ratio 29 (H) 12 - 28    Sodium 141 134 - 144 mmol/L    Potassium 5.2 3.5 - 5.2 mmol/L    Chloride 101 96 - 106 mmol/L    Total CO2 24 20 - 29 mmol/L    Calcium 10.1 8.7 - 10.3 mg/dL   Hemoglobin A1c    Collection Time: 05/12/25  2:42 PM    Specimen: Arm, Left; Blood    Blood  Release to smiley   Result Value Ref Range    Hemoglobin A1C 5.6 4.8 - 5.6 %   CBC & Differential    Collection Time: 05/12/25  2:42 PM    Specimen: Arm, Left; Blood    Blood  Release to smiley   Result Value Ref Range    WBC 9.7 3.4 - 10.8 x10E3/uL    RBC 4.26 3.77 - 5.28 x10E6/uL    Hemoglobin 13.5 11.1 - 15.9 g/dL    Hematocrit 41.1 34.0 - 46.6 %    MCV 97 79 - 97 fL    MCH 31.7 26.6 - 33.0 pg    MCHC 32.8 31.5 - 35.7 g/dL    RDW 12.7 11.7 - 15.4 %    Platelets 195 150 - 450 x10E3/uL    Neutrophil Rel % 49 Not Estab. %    Lymphocyte Rel % 39 Not Estab. %    Monocyte Rel % 7 Not Estab. %    Eosinophil Rel % 4 Not Estab. %    Basophil Rel % 1 Not Estab. %    Neutrophils Absolute 4.8 1.4 - 7.0 x10E3/uL    Lymphocytes Absolute 3.8 (H) 0.7 - 3.1 x10E3/uL    Monocytes Absolute 0.7 0.1 - 0.9 x10E3/uL    Eosinophils Absolute 0.4 0.0 - 0.4 x10E3/uL    Basophils Absolute 0.1 0.0 - 0.2 x10E3/uL    Immature Granulocyte Rel % 0 Not Estab. %    Immature Grans Absolute 0.0 0.0 - 0.1 x10E3/uL       Imaging:   No valid procedures specified.      Smoking Cessation:   3-10 mintues spent counseling Not agreeable to stopping    Assessment / Plan      Assessment/Plan:   Diagnoses and all orders for this visit:    1. Preop examination (Primary)  Assessment & Plan:  Patient being referred for colonoscopy under conscious sedation.  EKG today unremarkable.  No concerning or physical findings.  Checking screening labs to ensure stable    Orders:  -     CBC & Differential; Future  -     Basic Metabolic Panel; Future  -     Cancel: ECG 12 Lead  -     Basic Metabolic Panel  -     CBC & Differential  -     ECG 12 Lead    2. Prediabetes  Assessment & Plan:  Hemoglobin A1c 5.7% in 11/2024, repeat testing pending.  Advised regarding need to pursue healthy lifestyle with diet and exercise.  Typically will repeat study in 6 months    Orders:  -     Cancel: POC Glycosylated Hemoglobin (Hb A1C)  -     Cancel: POC Glucose, Blood  -     Glucose, Random  -     Hemoglobin A1c; Future  -     Hemoglobin A1c    3. Essential (primary) hypertension  Assessment & Plan:  Maintaining very satisfactory blood pressure control acutely as well as chronically taking low-dose lisinopril 10 mg daily.  Continue current regimen with monitoring, pursue healthy lifestyle efforts with diet avoiding added salt and regular exercise along with recommendation for smoking cessation    Orders:  -     Basic Metabolic Panel; Future  -     Cancel: ECG 12 Lead  -     Basic Metabolic Panel  -     ECG 12 Lead    4. Overweight (BMI 25.0-29.9)  Assessment & Plan:  Stable weight and BMI over the last 6 months, encouraged to pursue healthy lifestyle efforts with diet and exercise      5. Stage 3a chronic kidney disease  Assessment & Plan:  Most creatinine 1.21 with GFR 47 in 11/2024.  Repeat BMP.  Continues on lisinopril 10 mg daily       6. Seasonal allergic rhinitis due to pollen  Assessment & Plan:  Continue Allegra, anticipating some benefit from prednisone, adding Flonase as needed, and add montelukast 10 mg  daily prophylaxis.  Assess clinical response    Orders:  -     fluticasone (FLONASE) 50 MCG/ACT nasal spray; Administer 2 sprays into the nostril(s) as directed by provider Daily.  Dispense: 16 g; Refill: 5  -     montelukast (Singulair) 10 MG tablet; Take 1 tablet by mouth Every Night.  Dispense: 90 tablet; Refill: 3  -     fexofenadine (Allegra Allergy) 180 MG tablet; Take 1 tablet by mouth Daily.  Dispense: 90 tablet; Refill: 3  -     predniSONE (DELTASONE) 20 MG tablet; Take 1 tablet by mouth 2 (Two) Times a Day for 6 days.  Dispense: 12 tablet; Refill: 0    7. Chronic right shoulder pain  Assessment & Plan:  Progression over the last year of acute on chronic right shoulder pain, x-rays in 6/2024 revealing mild degenerative arthritis.  She does have significant decreased mobility of the shoulder with positive Neer sign and positive Colin sign.  Plan treat with prednisone, and if not clinically improving her pain in the next 7 to 9 days, if she has any significant recurrence of pain after stopping the oral steroids, then she is to schedule appoint for consideration of intra-articular steroid injection into the shoulder    Orders:  -     predniSONE (DELTASONE) 20 MG tablet; Take 1 tablet by mouth 2 (Two) Times a Day for 6 days.  Dispense: 12 tablet; Refill: 0    8. Chronic bilateral low back pain with bilateral sciatica  Assessment & Plan:  Chronic lumbago, more acutely worse on the left with sciatica, having previously seen pain management status post RFA of L3-L5 in 8/2023, not following up subsequently having declined opiate therapy.  She is taking gabapentin 800 mg 3 times daily with only modest benefit, 6 months but having had an increase in doxepin from 100 mg up to 150 mg nightly.  Not certain the doxepin is really offering her any benefit in the pain.  She will try stopping the doxepin on a trial basis, and if she does note deterioration in her pain control at that stage, we will then reinitiate.   Avoiding NSAIDs given her chronic kidney disease.  Has also previously been prescribed Lidoderm patches to use as needed.  Patient not certain that doxepin is actually benefiting her pain.. We previously discussed treatment options including referral back to pain management, which she has declined.  Reassess in 6 months  SmartLink not supported outside of the Encounter Diagnoses SmartSection.      Orders:  -     predniSONE (DELTASONE) 20 MG tablet; Take 1 tablet by mouth 2 (Two) Times a Day for 6 days.  Dispense: 12 tablet; Refill: 0    9. Fibromyalgia  Assessment & Plan:  Currently managed with gabapentin 800 mg 3 times daily which patient does feel helps with pain, but not certain the doxepin 150 mg nightly does benefit her pain.  She will try stopping the doxepin on a trial basis, and if the symptoms worsen then reinitiate      10. Cigarette smoker  Assessment & Plan:  40-pack-year history of cigarette smoking, currently reduced down to 0.25 packs/day over the last several years.  Recent low-dose screening chest CT stable in 11/2024.  Strongly advised need to stop smoking for health risk reduction, unfortunately patient having no interest in smoking cessation at this time despite knowledge regarding health risks.  SmartLink not supported outside of the Encounter Diagnoses SmartSection.        11. History of colon polyps  Assessment & Plan:  Multiple polyps noted on colonoscopy from 5/25/2022 with 3-year follow-up study recommended.  Referring for repeat colonoscopy    Orders:  -     Ambulatory Referral For Screening Colonoscopy    12. Screen for colon cancer  Assessment & Plan:  Colonoscopy on 5/25/2022 revealing multiple polyps, 3-year follow-up study recommended with Dr. Brennan.  Referral given for follow-up colonoscopy.    Orders:  -     CBC & Differential; Future  -     Basic Metabolic Panel; Future  -     Ambulatory Referral For Screening Colonoscopy  -     Basic Metabolic Panel  -     CBC &  Differential        Vaccine Counseling:      Follow Up:   Return in about 6 months (around 11/12/2025) for Medicare Subsq..      At [facility], we believe that sharing information builds trust and better relationships. You are receiving this note because you recently visited [facility]. It is possible you will see health information before a provider has talked with you about it. This kind of information can be easy to misunderstand. To help you fully understand what it means for your health, we urge you to discuss this note with your provider.    Kai Coats MD  [facility]

## 2025-05-12 NOTE — ASSESSMENT & PLAN NOTE
40-pack-year history of cigarette smoking, currently reduced down to 0.25 packs/day over the last several years.  Recent low-dose screening chest CT stable in 11/2024.  Strongly advised need to stop smoking for health risk reduction, unfortunately patient having no interest in smoking cessation at this time despite knowledge regarding health risks.  SmartLink not supported outside of the Encounter Diagnoses SmartSection.

## 2025-05-13 ENCOUNTER — RESULTS FOLLOW-UP (OUTPATIENT)
Dept: FAMILY MEDICINE CLINIC | Facility: CLINIC | Age: 75
End: 2025-05-13
Payer: MEDICARE

## 2025-05-13 LAB
BASOPHILS # BLD AUTO: 0.1 X10E3/UL (ref 0–0.2)
BASOPHILS NFR BLD AUTO: 1 %
BUN SERPL-MCNC: 34 MG/DL (ref 8–27)
BUN/CREAT SERPL: 29 (ref 12–28)
CALCIUM SERPL-MCNC: 10.1 MG/DL (ref 8.7–10.3)
CHLORIDE SERPL-SCNC: 101 MMOL/L (ref 96–106)
CO2 SERPL-SCNC: 24 MMOL/L (ref 20–29)
CREAT SERPL-MCNC: 1.16 MG/DL (ref 0.57–1)
EGFRCR SERPLBLD CKD-EPI 2021: 50 ML/MIN/1.73
EOSINOPHIL # BLD AUTO: 0.4 X10E3/UL (ref 0–0.4)
EOSINOPHIL NFR BLD AUTO: 4 %
ERYTHROCYTE [DISTWIDTH] IN BLOOD BY AUTOMATED COUNT: 12.7 % (ref 11.7–15.4)
GLUCOSE SERPL-MCNC: 83 MG/DL (ref 70–99)
HBA1C MFR BLD: 5.6 % (ref 4.8–5.6)
HCT VFR BLD AUTO: 41.1 % (ref 34–46.6)
HGB BLD-MCNC: 13.5 G/DL (ref 11.1–15.9)
IMM GRANULOCYTES # BLD AUTO: 0 X10E3/UL (ref 0–0.1)
IMM GRANULOCYTES NFR BLD AUTO: 0 %
LYMPHOCYTES # BLD AUTO: 3.8 X10E3/UL (ref 0.7–3.1)
LYMPHOCYTES NFR BLD AUTO: 39 %
MCH RBC QN AUTO: 31.7 PG (ref 26.6–33)
MCHC RBC AUTO-ENTMCNC: 32.8 G/DL (ref 31.5–35.7)
MCV RBC AUTO: 97 FL (ref 79–97)
MONOCYTES # BLD AUTO: 0.7 X10E3/UL (ref 0.1–0.9)
MONOCYTES NFR BLD AUTO: 7 %
NEUTROPHILS # BLD AUTO: 4.8 X10E3/UL (ref 1.4–7)
NEUTROPHILS NFR BLD AUTO: 49 %
PLATELET # BLD AUTO: 195 X10E3/UL (ref 150–450)
POTASSIUM SERPL-SCNC: 5.2 MMOL/L (ref 3.5–5.2)
RBC # BLD AUTO: 4.26 X10E6/UL (ref 3.77–5.28)
SODIUM SERPL-SCNC: 141 MMOL/L (ref 134–144)
WBC # BLD AUTO: 9.7 X10E3/UL (ref 3.4–10.8)

## 2025-06-04 DIAGNOSIS — J43.9 PULMONARY EMPHYSEMA, UNSPECIFIED EMPHYSEMA TYPE: ICD-10-CM

## 2025-06-04 DIAGNOSIS — J30.1 SEASONAL ALLERGIC RHINITIS DUE TO POLLEN: ICD-10-CM

## 2025-06-04 RX ORDER — FLUTICASONE PROPIONATE 50 MCG
2 SPRAY, SUSPENSION (ML) NASAL DAILY
Qty: 16 G | Refills: 5 | Status: SHIPPED | OUTPATIENT
Start: 2025-06-04

## 2025-06-04 RX ORDER — ALBUTEROL SULFATE 90 UG/1
2 INHALANT RESPIRATORY (INHALATION) EVERY 4 HOURS PRN
Qty: 18 G | Refills: 3 | Status: SHIPPED | OUTPATIENT
Start: 2025-06-04

## 2025-06-04 NOTE — TELEPHONE ENCOUNTER
Caller: Farhan Kymberly L    Relationship: Self    Best call back number: [phone]    Requested Prescriptions:   Requested Prescriptions     Pending Prescriptions Disp Refills    fluticasone (FLONASE) 50 MCG/ACT nasal spray 16 g 5     Sig: Administer 2 sprays into the nostril(s) as directed by provider Daily.        Pharmacy where request should be sent: [address] [phone] [facility] [phone] FX     Last office visit with prescribing clinician: 5/12/2025   Last telemedicine visit with prescribing clinician: Visit date not found   Next office visit with prescribing clinician: 11/14/2025     Additional details provided by patient: PATIENT IS COMPLETELY OUT    Does the patient have less than a 3 day supply:  [x] Yes  [] No    Would you like a call back once the refill request has been completed: [] Yes [x] No    If the office needs to give you a call back, can they leave a voicemail: [] Yes [x] No    Paulo Foster Rep   06/04/25 10:41 EDT

## 2025-06-04 NOTE — TELEPHONE ENCOUNTER
Caller: RealKymberly    Relationship: Self    Best call back number: [phone]     Requested Prescriptions:   Requested Prescriptions     Pending Prescriptions Disp Refills    albuterol sulfate  (90 Base) MCG/ACT inhaler 18 g 3     Sig: Inhale 2 puffs Every 4 (Four) Hours As Needed for Wheezing.        Pharmacy where request should be sent: [address] [phone] [facility] [phone]     Last office visit with prescribing clinician: 5/12/2025   Last telemedicine visit with prescribing clinician: Visit date not found   Next office visit with prescribing clinician: 11/14/2025     Additional details provided by patient: PT HAS A FEW DOSES LEFT.    Does the patient have less than a 3 day supply:  [x] Yes  [] No    Would you like a call back once the refill request has been completed: [] Yes [x] No    If the office needs to give you a call back, can they leave a voicemail: [] Yes [x] No    Paulo Guzmán Rep   06/04/25 11:56 EDT

## 2025-06-26 DIAGNOSIS — L50.9 URTICARIA: ICD-10-CM

## 2025-06-26 RX ORDER — HYDROXYZINE HYDROCHLORIDE 25 MG/1
TABLET, FILM COATED ORAL
Qty: 30 TABLET | Refills: 0 | OUTPATIENT
Start: 2025-06-26

## 2025-06-27 DIAGNOSIS — L50.9 URTICARIA: ICD-10-CM

## 2025-06-27 RX ORDER — HYDROXYZINE HYDROCHLORIDE 25 MG/1
TABLET, FILM COATED ORAL
Qty: 30 TABLET | Refills: 0 | OUTPATIENT
Start: 2025-06-27

## 2025-06-27 NOTE — TELEPHONE ENCOUNTER
I have denied. This rx was discontinued by Dr. Kai Coats. I have left Kymberly galaviz vm regarding this. TF

## 2025-06-30 ENCOUNTER — TELEPHONE (OUTPATIENT)
Dept: FAMILY MEDICINE CLINIC | Facility: CLINIC | Age: 75
End: 2025-06-30
Payer: MEDICARE

## 2025-07-29 ENCOUNTER — OFFICE VISIT (OUTPATIENT)
Dept: FAMILY MEDICINE CLINIC | Facility: CLINIC | Age: 75
End: 2025-07-29
Payer: MEDICARE

## 2025-07-29 VITALS
HEART RATE: 84 BPM | HEIGHT: 60 IN | TEMPERATURE: 97.9 F | WEIGHT: 140.8 LBS | BODY MASS INDEX: 27.64 KG/M2 | SYSTOLIC BLOOD PRESSURE: 126 MMHG | DIASTOLIC BLOOD PRESSURE: 82 MMHG | OXYGEN SATURATION: 98 %

## 2025-07-29 DIAGNOSIS — F17.210 CIGARETTE SMOKER: ICD-10-CM

## 2025-07-29 DIAGNOSIS — M54.41 CHRONIC BILATERAL LOW BACK PAIN WITH BILATERAL SCIATICA: ICD-10-CM

## 2025-07-29 DIAGNOSIS — L50.9 URTICARIA: ICD-10-CM

## 2025-07-29 DIAGNOSIS — I10 ESSENTIAL (PRIMARY) HYPERTENSION: ICD-10-CM

## 2025-07-29 DIAGNOSIS — G89.29 CHRONIC RIGHT SHOULDER PAIN: Primary | ICD-10-CM

## 2025-07-29 DIAGNOSIS — G89.29 CHRONIC BILATERAL LOW BACK PAIN WITH BILATERAL SCIATICA: ICD-10-CM

## 2025-07-29 DIAGNOSIS — M54.42 CHRONIC BILATERAL LOW BACK PAIN WITH BILATERAL SCIATICA: ICD-10-CM

## 2025-07-29 DIAGNOSIS — M25.511 CHRONIC RIGHT SHOULDER PAIN: Primary | ICD-10-CM

## 2025-07-29 PROCEDURE — 3074F SYST BP LT 130 MM HG: CPT | Performed by: INTERNAL MEDICINE

## 2025-07-29 PROCEDURE — 99214 OFFICE O/P EST MOD 30 MIN: CPT | Performed by: INTERNAL MEDICINE

## 2025-07-29 PROCEDURE — 1159F MED LIST DOCD IN RCRD: CPT | Performed by: INTERNAL MEDICINE

## 2025-07-29 PROCEDURE — 20610 DRAIN/INJ JOINT/BURSA W/O US: CPT | Performed by: INTERNAL MEDICINE

## 2025-07-29 PROCEDURE — 3079F DIAST BP 80-89 MM HG: CPT | Performed by: INTERNAL MEDICINE

## 2025-07-29 PROCEDURE — 1160F RVW MEDS BY RX/DR IN RCRD: CPT | Performed by: INTERNAL MEDICINE

## 2025-07-29 PROCEDURE — 1125F AMNT PAIN NOTED PAIN PRSNT: CPT | Performed by: INTERNAL MEDICINE

## 2025-07-29 RX ORDER — METHYLPREDNISOLONE ACETATE 40 MG/ML
80 INJECTION, SUSPENSION INTRA-ARTICULAR; INTRALESIONAL; INTRAMUSCULAR; SOFT TISSUE
Status: COMPLETED | OUTPATIENT
Start: 2025-07-29 | End: 2025-07-29

## 2025-07-29 RX ORDER — EPINEPHRINE 0.3 MG/.3ML
0.3 INJECTION SUBCUTANEOUS ONCE
Qty: 1 EACH | Refills: 0 | Status: SHIPPED | OUTPATIENT
Start: 2025-07-29 | End: 2025-07-29

## 2025-07-29 RX ORDER — HYDRALAZINE HYDROCHLORIDE 25 MG/1
25 TABLET, FILM COATED ORAL 3 TIMES DAILY
Qty: 90 TABLET | Refills: 1 | Status: SHIPPED | OUTPATIENT
Start: 2025-07-29

## 2025-07-29 RX ORDER — LIDOCAINE HYDROCHLORIDE 10 MG/ML
6 INJECTION, SOLUTION EPIDURAL; INFILTRATION; INTRACAUDAL; PERINEURAL
Status: COMPLETED | OUTPATIENT
Start: 2025-07-29 | End: 2025-07-29

## 2025-07-29 RX ADMIN — LIDOCAINE HYDROCHLORIDE 6 ML: 10 INJECTION, SOLUTION EPIDURAL; INFILTRATION; INTRACAUDAL; PERINEURAL at 15:08

## 2025-07-29 RX ADMIN — METHYLPREDNISOLONE ACETATE 80 MG: 40 INJECTION, SUSPENSION INTRA-ARTICULAR; INTRALESIONAL; INTRAMUSCULAR; SOFT TISSUE at 15:08

## 2025-07-29 NOTE — PROGRESS NOTES
Follow Up Office Visit      Date: 2025   Patient Name: Kymberly Real  : 1950   MRN: [id number]     Chief Complaint:    Chief Complaint   Patient presents with    right shoulder pain       History of Present Illness: Kymberly Real is a 75 y.o. female who is here today for reevaluation of chronic right shoulder pain present for at least a year now, becoming significant more problematic, 2 months ago having been given a course of oral steroids which did transiently help.  The pain is worse with any sort of movement of the shoulder, especially rotational or abduction, more noted anteriorly on the shoulder joint.  Did have x-rays of the right shoulder in 2024 which revealed mild degenerative arthritis.  No neck pain, no numbness or tingling in the right arm, weakness only secondary to pain.  Otherwise doing fairly well.  Blood pressure averaging 120/80 taking lisinopril 10 mg daily, continues to smoke 4 to 5 cigarettes daily currently with most recent low-dose screening chest CT stable from 2024, chronic back pain with sciatica stable on gabapentin 800 mg 3 times daily.  History of colonic polyps from colonoscopy in  with recommended follow-up, given referral colonoscopy with Dr. Brennan in 2024 patient having had to cancel her appointment subsequently which has not yet been rescheduled.  Patient also requested refill of hydroxyzine previously prescribed for urticaria with associated pruritus.  Last prescription was 2 months ago and subsequent not refilled.  Typically takes once weekly no more than daily.    Subjective      Review of Systems:   Review of Systems    I have reviewed the patients family history, social history, past medical history, past surgical history and have updated it as appropriate.     Medications:     Current Outpatient Medications:     albuterol sulfate  (90 Base) MCG/ACT inhaler, Inhale 2 puffs Every 4 (Four) Hours As Needed for Wheezing., Disp: 18 g, Rfl:  3    Calcium Carbonate-Vitamin D 600-10 MG-MCG per tablet, Take 1 tablet by mouth 2 (Two) Times a Day., Disp: 180 tablet, Rfl: 2    doxepin (SINEquan) 150 MG capsule, Take 1 capsule by mouth Every Night., Disp: 90 capsule, Rfl: 3    fexofenadine (Allegra Allergy) 180 MG tablet, Take 1 tablet by mouth Daily., Disp: 90 tablet, Rfl: 3    fluticasone (FLONASE) 50 MCG/ACT nasal spray, Administer 2 sprays into the nostril(s) as directed by provider Daily., Disp: 16 g, Rfl: 5    gabapentin (NEURONTIN) 800 MG tablet, TAKE 1 TABLET BY MOUTH THREE TIMES DAILY, Disp: 90 tablet, Rfl: 5    lidocaine (LIDODERM) 5 %, Place 1 patch on the skin as directed by provider Daily. Remove & Discard patch within 12 hours or as directed by MD, Disp: 60 patch, Rfl: 11    lisinopril (PRINIVIL,ZESTRIL) 10 MG tablet, Take 1 tablet by mouth once daily, Disp: 90 tablet, Rfl: 2    magnesium oxide (MAG-OX) 400 MG tablet, Take 1 tablet by mouth Daily., Disp: , Rfl:     montelukast (Singulair) 10 MG tablet, Take 1 tablet by mouth Every Night., Disp: 90 tablet, Rfl: 3    Omega-3 Fatty Acids (fish oil) 1000 MG capsule capsule, Take 1 capsule by mouth Daily With Breakfast., Disp: 90 capsule, Rfl: 2    polyethylene glycol (MiraLax) 17 GM/SCOOP powder, 1/2-1 capful daily mixed with glass of juice or water, titrating to maintain 1-2 soft bowel movements daily., Disp: 527 g, Rfl: 5    pravastatin (PRAVACHOL) 40 MG tablet, TAKE 1 TABLET BY MOUTH ONCE DAILY AT BEDTIME, Disp: 90 tablet, Rfl: 1    rOPINIRole (REQUIP) 1 MG tablet, TAKE 1 TABLET BY MOUTH NIGHTLY 1 HOUR BEFORE BEDTIME, Disp: 90 tablet, Rfl: 2    EPINEPHrine (EpiPen 2-Nacho) 0.3 MG/0.3ML solution auto-injector injection, Inject 0.3 mL into the appropriate muscle as directed by prescriber 1 (One) Time for 1 dose., Disp: 1 each, Rfl: 0    hydrALAZINE (APRESOLINE) 25 MG tablet, Take 1 tablet by mouth 3 (Three) Times a Day., Disp: 90 tablet, Rfl: 1  No current facility-administered medications for this  "visit.    Allergies:   Allergies   Allergen Reactions    Duloxetine Swelling     Facial/neck swelling    Penicillins Hives    Cefdinir Rash       Objective     Physical Exam: Please see above  Vital Signs:   Vitals:    07/29/25 1426   BP: 126/82   BP Location: Left arm   Patient Position: Sitting   Cuff Size: Adult   Pulse: 84   Temp: 97.9 °F (36.6 °C)   TempSrc: Temporal   SpO2: 98%   Weight: 63.9 kg (140 lb 12.8 oz)   Height: 152.4 cm (60\")   PainSc: 10-Worst pain ever   PainLoc: Shoulder     Body mass index is 27.5 kg/m².  BMI is >= 25 and <30. (Overweight) The following options were offered after discussion;: exercise counseling/recommendations and nutrition counseling/recommendations       Physical Exam  Constitutional:       General: She is not in acute distress.     Appearance: Normal appearance. She is not ill-appearing.      Comments: Pleasant, healthy, NAD other than right shoulder discomfort, BMI 27.5   Cardiovascular:      Rate and Rhythm: Normal rate and regular rhythm.      Heart sounds: Normal heart sounds. No murmur heard.     No friction rub. No gallop.   Pulmonary:      Effort: Pulmonary effort is normal. No respiratory distress.      Breath sounds: Normal breath sounds.   Musculoskeletal:         General: Tenderness present.      Cervical back: No rigidity or tenderness.      Comments: Right shoulder with significant tenderness to palpation on anterior joint margin, positive Neer sign, positive Colin sign to internal but not to external rotation, no pain to flexion of the biceps or triceps muscles.   Lymphadenopathy:      Cervical: No cervical adenopathy.   Neurological:      General: No focal deficit present.      Mental Status: She is alert and oriented to person, place, and time. Mental status is at baseline.   Psychiatric:         Mood and Affect: Mood normal.         Arthrocentesis    Date/Time: 7/29/2025 3:08 PM    Performed by: Kai Coats MD  Authorized by: Kai Coats MD  " Indications: pain   Body area: shoulder  Joint: right shoulder  Local anesthesia used: yes    Anesthesia:  Local anesthesia used: yes  Local Anesthetic: lidocaine 1% without epinephrine  Anesthetic total: 6 mL    Sedation:  Patient sedated: no    Needle size: 22 G  Ultrasound guidance: no  Approach: posterior  Aspirate amount: 0 mL  Meds administered: 6 mL lidocaine PF 1% 1 %; 80 mg methylPREDNISolone acetate 40 MG/ML  Patient tolerance: patient tolerated the procedure well with no immediate complications  Comments: After written consent obtained from patient following discussion of risks and benefits, the right shoulder joint was prepped from posterior approach under routine sterile conditions, followed by local anesthesia administered with 3 mL of lidocaine 1% without epinephrine, followed injection of 3 mL lidocaine 1% without epinephrine and 2 mL of Depo-Medrol 40 mg/mL for total of 80 mg into the glenohumeral joint.  Tolerated well with no immediate complication and per her account at least a 60% improvement in her pain.          Results:   Labs:   Hemoglobin A1C   Date Value Ref Range Status   05/12/2025 5.6 4.8 - 5.6 % Final     Comment:              Prediabetes: 5.7 - 6.4           Diabetes: >6.4           Glycemic control for adults with diabetes: <7.0       TSH   Date Value Ref Range Status   11/12/2024 1.600 0.450 - 4.500 uIU/mL Final        POCT Results (if applicable):   Results for orders placed or performed in visit on 05/12/25   Basic Metabolic Panel    Collection Time: 05/12/25  2:42 PM    Specimen: Arm, Left; Blood    Blood  Release to smiley   Result Value Ref Range    Glucose 83 70 - 99 mg/dL    BUN 34 (H) 8 - 27 mg/dL    Creatinine 1.16 (H) 0.57 - 1.00 mg/dL    EGFR Result 50 (L) >59 mL/min/1.73    BUN/Creatinine Ratio 29 (H) 12 - 28    Sodium 141 134 - 144 mmol/L    Potassium 5.2 3.5 - 5.2 mmol/L    Chloride 101 96 - 106 mmol/L    Total CO2 24 20 - 29 mmol/L    Calcium 10.1 8.7 - 10.3 mg/dL    Hemoglobin A1c    Collection Time: 05/12/25  2:42 PM    Specimen: Arm, Left; Blood    Blood  Release to smiley   Result Value Ref Range    Hemoglobin A1C 5.6 4.8 - 5.6 %   CBC & Differential    Collection Time: 05/12/25  2:42 PM    Specimen: Arm, Left; Blood    Blood  Release to smiley   Result Value Ref Range    WBC 9.7 3.4 - 10.8 x10E3/uL    RBC 4.26 3.77 - 5.28 x10E6/uL    Hemoglobin 13.5 11.1 - 15.9 g/dL    Hematocrit 41.1 34.0 - 46.6 %    MCV 97 79 - 97 fL    MCH 31.7 26.6 - 33.0 pg    MCHC 32.8 31.5 - 35.7 g/dL    RDW 12.7 11.7 - 15.4 %    Platelets 195 150 - 450 x10E3/uL    Neutrophil Rel % 49 Not Estab. %    Lymphocyte Rel % 39 Not Estab. %    Monocyte Rel % 7 Not Estab. %    Eosinophil Rel % 4 Not Estab. %    Basophil Rel % 1 Not Estab. %    Neutrophils Absolute 4.8 1.4 - 7.0 x10E3/uL    Lymphocytes Absolute 3.8 (H) 0.7 - 3.1 x10E3/uL    Monocytes Absolute 0.7 0.1 - 0.9 x10E3/uL    Eosinophils Absolute 0.4 0.0 - 0.4 x10E3/uL    Basophils Absolute 0.1 0.0 - 0.2 x10E3/uL    Immature Granulocyte Rel % 0 Not Estab. %    Immature Grans Absolute 0.0 0.0 - 0.1 x10E3/uL         Smoking Cessation:   3-10 mintues spent counseling Will try to cut down    Assessment / Plan      Assessment/Plan:   Diagnoses and all orders for this visit:    1. Chronic right shoulder pain (Primary)  Assessment & Plan:  Clinical exam most consistent with rotator cuff tendinopathy, x-rays from 6/20/2024 revealing mild degenerative arthritis.  Chronic right shoulder pain worsened over the last year, in 5/2025 now 2 months ago given course of oral prednisone which did seem to briefly benefit but pain is now recurred again.  Interested in intra-articular steroid injection, which was performed today improving patient's shoulder pain per her account by 60%.  Assess clinical response over the next several weeks.  If she ultimately notes no significant improvement in her pain or significant recurrence in the near future, we will then obtain repeat  right shoulder x-rays for her to orthopedic surgery.    Orders:  -     Arthrocentesis  -     lidocaine PF 1% (XYLOCAINE) injection 6 mL  -     methylPREDNISolone acetate (DEPO-medrol) injection 80 mg    2. Essential (primary) hypertension  Assessment & Plan:  Satisfactory blood pressure control acutely as well as chronically taking lisinopril 10 mg daily.  Continue current regimen with monitoring.      3. Chronic bilateral low back pain with bilateral sciatica  Assessment & Plan:  Chronic lumbago, more acutely worse on the left with sciatica, having previously seen pain management status post RFA of L3-L5 in 8/2023, not following up subsequently having declined opiate therapy.  She is taking gabapentin 800 mg 3 times daily with only modest benefit, subsequently having had an increase in doxepin from 100 mg up to 150 mg nightly.  Had been advised to try stopping doxepin on a trial basis, reinitiating given apparent clinical benefit.  Avoiding NSAIDs given her chronic kidney disease.  Has also previously been prescribed Lidoderm patches to use as needed.  We previously discussed treatment options including referral back to pain management, which she has declined.  Reassess in 6 months  SmartLink not supported outside of the Encounter Diagnoses SmartSection.        4. Urticaria  Assessment & Plan:  Chronic intermittent urticaria with pruritus, patient indicating triggered by pressure/touch.  Refill hydroxyzine to be used as needed as directed, noting this does benefit her symptoms.  She also has prescription of EpiPen to use as needed.    Orders:  -     hydrALAZINE (APRESOLINE) 25 MG tablet; Take 1 tablet by mouth 3 (Three) Times a Day.  Dispense: 90 tablet; Refill: 1  -     EPINEPHrine (EpiPen 2-Nacho) 0.3 MG/0.3ML solution auto-injector injection; Inject 0.3 mL into the appropriate muscle as directed by prescriber 1 (One) Time for 1 dose.  Dispense: 1 each; Refill: 0    5. Cigarette smoker  Assessment &  Plan:  40-pack-year history of cigarette smoking, currently reduced down to 0.25 packs/day over the last several years.  Recent low-dose screening chest CT stable in 11/2024 1 year follow-up recommended.  Again I have strongly advised need to stop smoking for health risk reduction, unfortunately patient continuing to have no interest in smoking cessation at this time despite knowledge regarding health risks.  SmartLink not supported outside of the Encounter Diagnoses SmartSection.            Vaccine Counseling:      Follow Up:   Return in about 4 months (around 11/29/2025) for Medicare Subsq..      At [facility], we believe that sharing information builds trust and better relationships. You are receiving this note because you recently visited [facility]. It is possible you will see health information before a provider has talked with you about it. This kind of information can be easy to misunderstand. To help you fully understand what it means for your health, we urge you to discuss this note with your provider.    Kai Coats MD  [facility]

## 2025-07-29 NOTE — ASSESSMENT & PLAN NOTE
40-pack-year history of cigarette smoking, currently reduced down to 0.25 packs/day over the last several years.  Recent low-dose screening chest CT stable in 11/2024 1 year follow-up recommended.  Again I have strongly advised need to stop smoking for health risk reduction, unfortunately patient continuing to have no interest in smoking cessation at this time despite knowledge regarding health risks.  SmartLink not supported outside of the Encounter Diagnoses SmartSection.

## 2025-07-29 NOTE — ASSESSMENT & PLAN NOTE
Chronic lumbago, more acutely worse on the left with sciatica, having previously seen pain management status post RFA of L3-L5 in 8/2023, not following up subsequently having declined opiate therapy.  She is taking gabapentin 800 mg 3 times daily with only modest benefit, subsequently having had an increase in doxepin from 100 mg up to 150 mg nightly.  Had been advised to try stopping doxepin on a trial basis, reinitiating given apparent clinical benefit.  Avoiding NSAIDs given her chronic kidney disease.  Has also previously been prescribed Lidoderm patches to use as needed.  We previously discussed treatment options including referral back to pain management, which she has declined.  Reassess in 6 months  SmartLink not supported outside of the Encounter Diagnoses SmartSection.

## 2025-07-29 NOTE — ASSESSMENT & PLAN NOTE
Satisfactory blood pressure control acutely as well as chronically taking lisinopril 10 mg daily.  Continue current regimen with monitoring.

## 2025-07-29 NOTE — ASSESSMENT & PLAN NOTE
Clinical exam most consistent with rotator cuff tendinopathy, x-rays from 6/20/2024 revealing mild degenerative arthritis.  Chronic right shoulder pain worsened over the last year, in 5/2025 now 2 months ago given course of oral prednisone which did seem to briefly benefit but pain is now recurred again.  Interested in intra-articular steroid injection, which was performed today improving patient's shoulder pain per her account by 60%.  Assess clinical response over the next several weeks.  If she ultimately notes no significant improvement in her pain or significant recurrence in the near future, we will then obtain repeat right shoulder x-rays for her to orthopedic surgery.

## 2025-07-29 NOTE — ASSESSMENT & PLAN NOTE
Chronic intermittent urticaria with pruritus, patient indicating triggered by pressure/touch.  Refill hydroxyzine to be used as needed as directed, noting this does benefit her symptoms.  She also has prescription of EpiPen to use as needed.

## 2025-08-22 RX ORDER — LISINOPRIL 10 MG/1
10 TABLET ORAL DAILY
Qty: 90 TABLET | Refills: 0 | Status: SHIPPED | OUTPATIENT
Start: 2025-08-22

## 2025-08-26 RX ORDER — ROPINIROLE 1 MG/1
TABLET, FILM COATED ORAL
Qty: 90 TABLET | Refills: 1 | Status: SHIPPED | OUTPATIENT
Start: 2025-08-26